# Patient Record
Sex: FEMALE | Race: WHITE | Employment: OTHER | URBAN - METROPOLITAN AREA
[De-identification: names, ages, dates, MRNs, and addresses within clinical notes are randomized per-mention and may not be internally consistent; named-entity substitution may affect disease eponyms.]

---

## 2018-07-30 ENCOUNTER — HOSPITAL ENCOUNTER (EMERGENCY)
Age: 83
Discharge: HOME | End: 2018-07-30
Attending: EMERGENCY MEDICINE
Payer: MEDICARE

## 2018-07-30 ENCOUNTER — APPOINTMENT (OUTPATIENT)
Dept: CT IMAGING | Age: 83
End: 2018-07-30
Payer: MEDICARE

## 2018-07-30 VITALS
SYSTOLIC BLOOD PRESSURE: 125 MMHG | RESPIRATION RATE: 16 BRPM | DIASTOLIC BLOOD PRESSURE: 54 MMHG | HEIGHT: 62 IN | OXYGEN SATURATION: 99 % | HEART RATE: 77 BPM | WEIGHT: 100 LBS | BODY MASS INDEX: 18.4 KG/M2 | TEMPERATURE: 98.1 F

## 2018-07-30 DIAGNOSIS — S09.90XA INJURY OF HEAD, INITIAL ENCOUNTER: Primary | ICD-10-CM

## 2018-07-30 PROCEDURE — 72125 CT NECK SPINE W/O DYE: CPT

## 2018-07-30 PROCEDURE — 70450 CT HEAD/BRAIN W/O DYE: CPT

## 2018-07-30 PROCEDURE — 6370000000 HC RX 637 (ALT 250 FOR IP): Performed by: EMERGENCY MEDICINE

## 2018-07-30 PROCEDURE — 99284 EMERGENCY DEPT VISIT MOD MDM: CPT

## 2018-07-30 RX ORDER — ACETAMINOPHEN 325 MG/1
650 TABLET ORAL ONCE
Status: COMPLETED | OUTPATIENT
Start: 2018-07-30 | End: 2018-07-30

## 2018-07-30 RX ORDER — FLUTICASONE PROPIONATE 50 MCG
1 SPRAY, SUSPENSION (ML) NASAL DAILY
COMMUNITY

## 2018-07-30 RX ORDER — ESCITALOPRAM OXALATE 10 MG/1
20 TABLET ORAL DAILY
Status: ON HOLD | COMMUNITY
End: 2020-10-13

## 2018-07-30 RX ORDER — PHENOL 1.4 %
1 AEROSOL, SPRAY (ML) MUCOUS MEMBRANE NIGHTLY
COMMUNITY

## 2018-07-30 RX ORDER — PRAVASTATIN SODIUM 20 MG
20 TABLET ORAL DAILY
Status: ON HOLD | COMMUNITY
End: 2020-10-13

## 2018-07-30 RX ADMIN — ACETAMINOPHEN 650 MG: 325 TABLET ORAL at 07:52

## 2018-07-30 ASSESSMENT — PAIN SCALES - GENERAL: PAINLEVEL_OUTOF10: 5

## 2018-07-30 NOTE — ED NOTES
Pt voided large amt on bedpain moves well in bed without discomfort     Vanessa Cueto, RN  07/30/18 0931

## 2018-10-04 ENCOUNTER — HOSPITAL ENCOUNTER (EMERGENCY)
Age: 83
Discharge: HOME | End: 2018-10-05
Attending: EMERGENCY MEDICINE
Payer: MEDICARE

## 2018-10-04 VITALS
HEART RATE: 71 BPM | HEIGHT: 62 IN | OXYGEN SATURATION: 99 % | TEMPERATURE: 98.3 F | WEIGHT: 100 LBS | SYSTOLIC BLOOD PRESSURE: 113 MMHG | DIASTOLIC BLOOD PRESSURE: 56 MMHG | RESPIRATION RATE: 16 BRPM | BODY MASS INDEX: 18.4 KG/M2

## 2018-10-04 DIAGNOSIS — G30.8 ALZHEIMER'S DISEASE OF OTHER ONSET WITH BEHAVIORAL DISTURBANCE: ICD-10-CM

## 2018-10-04 DIAGNOSIS — F02.818 ALZHEIMER'S DISEASE OF OTHER ONSET WITH BEHAVIORAL DISTURBANCE: ICD-10-CM

## 2018-10-04 DIAGNOSIS — R45.1 AGITATION: Primary | ICD-10-CM

## 2018-10-04 LAB
BACTERIA: NORMAL /HPF
BILIRUBIN URINE: NEGATIVE
BLOOD, URINE: ABNORMAL
CASTS: NORMAL /LPF
CLARITY: CLEAR
COLOR: YELLOW
GLUCOSE URINE: NEGATIVE MG/DL
KETONES, URINE: NEGATIVE MG/DL
LEUKOCYTE ESTERASE, URINE: NEGATIVE
NITRITE, URINE: NEGATIVE
PH UA: 5.5 (ref 5–9)
PROTEIN UA: NEGATIVE MG/DL
RBC UA: NORMAL /HPF (ref 0–2)
SPECIFIC GRAVITY UA: 1.02 (ref 1–1.03)
UROBILINOGEN, URINE: 0.2 E.U./DL
WBC UA: NORMAL /HPF (ref 0–5)

## 2018-10-04 PROCEDURE — 81001 URINALYSIS AUTO W/SCOPE: CPT

## 2018-10-04 PROCEDURE — 99285 EMERGENCY DEPT VISIT HI MDM: CPT

## 2018-10-04 PROCEDURE — 87088 URINE BACTERIA CULTURE: CPT

## 2018-10-05 NOTE — ED PROVIDER NOTES
Department of Emergency Medicine   ED  Provider Note  Admit Date/RoomTime: 10/4/2018  9:51 PM  ED Room: VIDAL/VIDAL      History of Present Illness:  10/4/18, Time: 10:15 PM         Keeley Goode is a 80 y.o. female presenting to the ED for agitation, beginning a few days ago. The complaint has been persistent, moderate in severity, and worsened by nothing. Pt arrived to the ED from [facility]. Per EMS pt was in bed and nurse at [facility] attempted to get pt out of bed. Pt lowered herself to the ground. Nurse at [facility] wanted pt to be seen in ED stating that she had cataract surgery the other day and feels that she needs to be seen. Son was notified and is POA and stated that he did not want her to come to ED and feels that pt Alzheimer's is progressing. Pt denies any loc, chest pain, sob, palpitations, fever, chills, nausea, vomiting, diarrhea or abdominal pain. Review of Systems:   Pertinent positives and negatives are stated within HPI, all other systems reviewed and are negative.    --------------------------------------------- PAST HISTORY ------------------------------------------  Past Medical History:  has a past medical history of Dementia; Diaphragmatic hernia; Diverticulosis; Hyperlipidemia; Macular degeneration; and Thyroid disease. Past Surgical History:  has no past surgical history on file. Social History:  reports that she has never smoked. She has never used smokeless tobacco. She reports that she does not drink alcohol or use drugs. Family History: family history is not on file. The patients home medications have been reviewed. Allergies: Dolobid [diflunisal]; Fish-derived products; Horse-derived products; Iodine; Morphine; Nsaids; Other; Penicillins; Percocet [oxycodone-acetaminophen];  Shellfish-derived products; and Sulfa antibiotics    ----------------------------------------------PHYSICAL EXAM--------------------------------------  Constitutional/General: Alert and oriented x3, well appearing, non toxic in NAD  Head: Normocephalic and atraumatic  Mouth: Oropharynx clear, handling secretions, no trismus, no asymmetry of the posterior oropharynx or uvular edema  Neck: Supple, no JVD, full ROM, no crepitus and no meningeal signs  Respiratory: Lungs clear to auscultation bilaterally, no wheezes, rales, or rhonchi. Not in respiratory distress  Cardiovascular:  Regular rate. Regular rhythm. No murmurs, gallops, or rubs. 2+ distal pulses  Chest: No chest wall tenderness  GI:  Abdomen Soft, Non tender, Non distended. +BS. No rebound, guarding, or rigidity. No pulsatile masses. Musculoskeletal: Moves all extremities x 4. Warm and well perfused, no clubbing, cyanosis, or edema. Integument: Skin warm and dry. No rashes. Neurologic: GCS 15, no focal deficits, symmetric strength 5/5 in the upper and lower extremities bilaterally  Psychiatric: Normal Affect    -------------------------------------------------- RESULTS -------------------------------------------------  I have personally reviewed all laboratory and imaging results for this patient. Results are listed below.      LABS:  Results for orders placed or performed during the hospital encounter of 10/04/18   Urinalysis   Result Value Ref Range    Color, UA Yellow Straw/Yellow    Clarity, UA Clear Clear    Glucose, Ur Negative Negative mg/dL    Bilirubin Urine Negative Negative    Ketones, Urine Negative Negative mg/dL    Specific Gravity, UA 1.025 1.005 - 1.030    Blood, Urine MODERATE (A) Negative    pH, UA 5.5 5.0 - 9.0    Protein, UA Negative Negative mg/dL    Urobilinogen, Urine 0.2 <2.0 E.U./dL    Nitrite, Urine Negative Negative    Leukocyte Esterase, Urine Negative Negative   Microscopic Urinalysis   Result Value Ref Range    Casts RARE /LPF    WBC, UA 1-3 0 - 5 /HPF    RBC, UA 2-5 0 - 2 /HPF    Bacteria, UA NONE /HPF       RADIOLOGY:  Interpreted by Radiologist.  No orders to display       EKG:    ------------------------- NURSING

## 2018-10-07 LAB — URINE CULTURE, ROUTINE: NORMAL

## 2020-10-12 ENCOUNTER — APPOINTMENT (OUTPATIENT)
Dept: CT IMAGING | Age: 85
DRG: 689 | End: 2020-10-12
Payer: MEDICARE

## 2020-10-12 ENCOUNTER — HOSPITAL ENCOUNTER (INPATIENT)
Age: 85
LOS: 1 days | Discharge: OTHER INSTITUTION | DRG: 689 | End: 2020-10-15
Attending: EMERGENCY MEDICINE | Admitting: HOSPITALIST
Payer: MEDICARE

## 2020-10-12 ENCOUNTER — APPOINTMENT (OUTPATIENT)
Dept: GENERAL RADIOLOGY | Age: 85
DRG: 689 | End: 2020-10-12
Payer: MEDICARE

## 2020-10-12 PROBLEM — R41.82 AMS (ALTERED MENTAL STATUS): Status: ACTIVE | Noted: 2020-10-12

## 2020-10-12 LAB
ALBUMIN SERPL-MCNC: 2.5 G/DL (ref 3.5–5.2)
ALP BLD-CCNC: 79 U/L (ref 35–104)
ALT SERPL-CCNC: 61 U/L (ref 0–32)
AMORPHOUS: ABNORMAL
ANION GAP SERPL CALCULATED.3IONS-SCNC: 6 MMOL/L (ref 7–16)
AST SERPL-CCNC: 114 U/L (ref 0–31)
BACTERIA: ABNORMAL /HPF
BASOPHILS ABSOLUTE: 0.01 E9/L (ref 0–0.2)
BASOPHILS RELATIVE PERCENT: 0.1 % (ref 0–2)
BILIRUB SERPL-MCNC: 0.3 MG/DL (ref 0–1.2)
BILIRUBIN URINE: NEGATIVE
BLOOD, URINE: ABNORMAL
BUN BLDV-MCNC: 22 MG/DL (ref 8–23)
BURR CELLS: ABNORMAL
CALCIUM SERPL-MCNC: 9 MG/DL (ref 8.6–10.2)
CHLORIDE BLD-SCNC: 106 MMOL/L (ref 98–107)
CLARITY: CLEAR
CO2: 22 MMOL/L (ref 22–29)
COLOR: YELLOW
CREAT SERPL-MCNC: 0.7 MG/DL (ref 0.5–1)
EOSINOPHILS ABSOLUTE: 0 E9/L (ref 0.05–0.5)
EOSINOPHILS RELATIVE PERCENT: 0 % (ref 0–6)
EPITHELIAL CELLS, UA: ABNORMAL /HPF
GFR AFRICAN AMERICAN: >60
GFR NON-AFRICAN AMERICAN: >60 ML/MIN/1.73
GLUCOSE BLD-MCNC: 126 MG/DL (ref 74–99)
GLUCOSE URINE: NEGATIVE MG/DL
HCT VFR BLD CALC: 29.6 % (ref 34–48)
HEMOGLOBIN: 9.8 G/DL (ref 11.5–15.5)
IMMATURE GRANULOCYTES #: 0.02 E9/L
IMMATURE GRANULOCYTES %: 0.2 % (ref 0–5)
KETONES, URINE: ABNORMAL MG/DL
LACTIC ACID, SEPSIS: 0.8 MMOL/L (ref 0.5–1.9)
LEUKOCYTE ESTERASE, URINE: ABNORMAL
LIPASE: 17 U/L (ref 13–60)
LYMPHOCYTES ABSOLUTE: 0.52 E9/L (ref 1.5–4)
LYMPHOCYTES RELATIVE PERCENT: 5.1 % (ref 20–42)
MCH RBC QN AUTO: 29.9 PG (ref 26–35)
MCHC RBC AUTO-ENTMCNC: 33.1 % (ref 32–34.5)
MCV RBC AUTO: 90.2 FL (ref 80–99.9)
MONOCYTES ABSOLUTE: 0.79 E9/L (ref 0.1–0.95)
MONOCYTES RELATIVE PERCENT: 7.8 % (ref 2–12)
NEUTROPHILS ABSOLUTE: 8.78 E9/L (ref 1.8–7.3)
NEUTROPHILS RELATIVE PERCENT: 86.8 % (ref 43–80)
NITRITE, URINE: NEGATIVE
PDW BLD-RTO: 14.3 FL (ref 11.5–15)
PH UA: 5.5 (ref 5–9)
PLATELET # BLD: 155 E9/L (ref 130–450)
PMV BLD AUTO: 10.8 FL (ref 7–12)
POIKILOCYTES: ABNORMAL
POTASSIUM REFLEX MAGNESIUM: 4.1 MMOL/L (ref 3.5–5)
PROTEIN UA: 30 MG/DL
RBC # BLD: 3.28 E12/L (ref 3.5–5.5)
RBC UA: ABNORMAL /HPF (ref 0–2)
SARS-COV-2, NAAT: NOT DETECTED
SCHISTOCYTES: ABNORMAL
SODIUM BLD-SCNC: 134 MMOL/L (ref 132–146)
SPECIFIC GRAVITY UA: 1.02 (ref 1–1.03)
TOTAL PROTEIN: 5.8 G/DL (ref 6.4–8.3)
TROPONIN: <0.01 NG/ML (ref 0–0.03)
UROBILINOGEN, URINE: 0.2 E.U./DL
WBC # BLD: 10.1 E9/L (ref 4.5–11.5)
WBC UA: ABNORMAL /HPF (ref 0–5)

## 2020-10-12 PROCEDURE — 81001 URINALYSIS AUTO W/SCOPE: CPT

## 2020-10-12 PROCEDURE — 80053 COMPREHEN METABOLIC PANEL: CPT

## 2020-10-12 PROCEDURE — U0002 COVID-19 LAB TEST NON-CDC: HCPCS

## 2020-10-12 PROCEDURE — 71045 X-RAY EXAM CHEST 1 VIEW: CPT

## 2020-10-12 PROCEDURE — 36415 COLL VENOUS BLD VENIPUNCTURE: CPT

## 2020-10-12 PROCEDURE — 83690 ASSAY OF LIPASE: CPT

## 2020-10-12 PROCEDURE — 6360000002 HC RX W HCPCS: Performed by: STUDENT IN AN ORGANIZED HEALTH CARE EDUCATION/TRAINING PROGRAM

## 2020-10-12 PROCEDURE — 96374 THER/PROPH/DIAG INJ IV PUSH: CPT

## 2020-10-12 PROCEDURE — 84484 ASSAY OF TROPONIN QUANT: CPT

## 2020-10-12 PROCEDURE — 2580000003 HC RX 258: Performed by: STUDENT IN AN ORGANIZED HEALTH CARE EDUCATION/TRAINING PROGRAM

## 2020-10-12 PROCEDURE — G0378 HOSPITAL OBSERVATION PER HR: HCPCS

## 2020-10-12 PROCEDURE — 96361 HYDRATE IV INFUSION ADD-ON: CPT

## 2020-10-12 PROCEDURE — 87186 SC STD MICRODIL/AGAR DIL: CPT

## 2020-10-12 PROCEDURE — 83605 ASSAY OF LACTIC ACID: CPT

## 2020-10-12 PROCEDURE — 99220 PR INITIAL OBSERVATION CARE/DAY 70 MINUTES: CPT | Performed by: HOSPITALIST

## 2020-10-12 PROCEDURE — 93005 ELECTROCARDIOGRAM TRACING: CPT | Performed by: STUDENT IN AN ORGANIZED HEALTH CARE EDUCATION/TRAINING PROGRAM

## 2020-10-12 PROCEDURE — 87077 CULTURE AEROBIC IDENTIFY: CPT

## 2020-10-12 PROCEDURE — 99283 EMERGENCY DEPT VISIT LOW MDM: CPT

## 2020-10-12 PROCEDURE — 85025 COMPLETE CBC W/AUTO DIFF WBC: CPT

## 2020-10-12 PROCEDURE — 99284 EMERGENCY DEPT VISIT MOD MDM: CPT

## 2020-10-12 PROCEDURE — 87088 URINE BACTERIA CULTURE: CPT

## 2020-10-12 PROCEDURE — 87040 BLOOD CULTURE FOR BACTERIA: CPT

## 2020-10-12 PROCEDURE — 74176 CT ABD & PELVIS W/O CONTRAST: CPT

## 2020-10-12 RX ORDER — 0.9 % SODIUM CHLORIDE 0.9 %
1000 INTRAVENOUS SOLUTION INTRAVENOUS ONCE
Status: COMPLETED | OUTPATIENT
Start: 2020-10-12 | End: 2020-10-12

## 2020-10-12 RX ADMIN — SODIUM CHLORIDE 1000 ML: 9 INJECTION, SOLUTION INTRAVENOUS at 19:45

## 2020-10-12 RX ADMIN — CEFTRIAXONE 1 G: 1 INJECTION, POWDER, FOR SOLUTION INTRAMUSCULAR; INTRAVENOUS at 21:40

## 2020-10-12 RX ADMIN — SODIUM CHLORIDE 1000 ML: 9 INJECTION, SOLUTION INTRAVENOUS at 17:34

## 2020-10-12 ASSESSMENT — PAIN SCALES - PAIN ASSESSMENT IN ADVANCED DEMENTIA (PAINAD)
BREATHING: 0
CONSOLABILITY: 0
NEGVOCALIZATION: 0
FACIALEXPRESSION: 0
TOTALSCORE: 0
BODYLANGUAGE: 0

## 2020-10-12 NOTE — ED NOTES
Bed: 17  Expected date:   Expected time:   Means of arrival:   Comments:  EMS     Catarino Knott RN  10/12/20 2422

## 2020-10-12 NOTE — ED PROVIDER NOTES
[facility]  Department of Emergency Medicine     Written by: Eliz Garcia DO  Patient Name: Magi Hernandez  Attending Provider: Radhika Tong DO  Admit Date: 10/12/2020  4:37 PM  MRN: [id number]                   : 3/25/1933        No chief complaint on file. - Chief complaint    The history is provided by the EMS personnel. Patient is an 80-year-old female who presents the ED via EMS from nursing facility due to diarrhea and stool incontinence, change in mental status from baseline dementia, fatigue and lethargy, and vomiting, symptoms began developing \"a few days\" prior to this encounter. According to EMS, the patient has been less oriented than usual, having stool incontinence (apparently nonblack, nonbloody), and she vomited once today before dinner prior to arrival.  History is limited due to patient's mental status. On arrival she is pleasant, cooperative, and able to tell me that she is not having any chest pain, shortness of breath, cough, neck pain or stiffness, abdominal tenderness, nausea, or urinary symptoms, however she is not oriented to purpose. She is oriented only to self. Review of Systems   Unable to perform ROS: Mental status change        Physical Exam  Vitals signs and nursing note reviewed. Constitutional:       General: She is not in acute distress. Appearance: Normal appearance. She is not ill-appearing. Comments: Pleasant, fatigued, cooperative, oriented only to self. HENT:      Head: Normocephalic and atraumatic. Right Ear: External ear normal.      Left Ear: External ear normal.      Nose: Nose normal. No rhinorrhea. Mouth/Throat:      Mouth: Mucous membranes are dry. Pharynx: Oropharynx is clear. Eyes:      Extraocular Movements: Extraocular movements intact. Conjunctiva/sclera: Conjunctivae normal.      Pupils: Pupils are equal, round, and reactive to light.    Neck:      Musculoskeletal: Normal range of motion and neck supple. No neck rigidity or muscular tenderness. Cardiovascular:      Rate and Rhythm: Normal rate and regular rhythm. Pulses: Normal pulses. Heart sounds: Normal heart sounds. No murmur. Pulmonary:      Effort: Pulmonary effort is normal. No respiratory distress. Breath sounds: Normal breath sounds. No wheezing or rales. Abdominal:      General: Abdomen is flat. There is no distension. Palpations: Abdomen is soft. There is no mass. Tenderness: There is no abdominal tenderness. There is no right CVA tenderness or left CVA tenderness. Comments: Bowel sounds hyperactive     Musculoskeletal: Normal range of motion. General: No swelling or tenderness. Right lower leg: No edema. Left lower leg: No edema. Skin:     General: Skin is warm and dry. Capillary Refill: Capillary refill takes less than 2 seconds. Neurological:      General: No focal deficit present. Mental Status: She is disoriented. Motor: Weakness (generalized) present. Comments: Oriented only to self  Cooperative            Procedures       MDM     63-year-old female presents to the ED from an outside facility due to change in mental status from baseline, multiple episodes of diarrhea, and one episode of vomiting today. History is limited due to patient's condition. According to EMS the diarrhea was nonblack nonbloody; patient is pleasant and cooperative however she is oriented to only self; she is unable to give me a reason as to why she is here. Son was at bedside initially and reports that she is worse from her baseline dementia. On arrival patient's blood pressure is 114/58, he is afebrile and nontachycardic with normal O2 saturation on room air; she does not appear to be in any acute distress. Specifically, her abdominal exam is benign. Rapid Covid is negative. Troponin is negative. Lipase is within normal limits.   Hemoglobin is 9.8, unsure of this patient's baseline; stool Hemoccult is negative. Initial lactate is 0.8. Urinalysis is positive for 5-10 white blood cells, leukocyte esterase, given patient's altered mental status decision made to administer 1 g IV Rocephin. Patient's SBP continues to remain in the low 100s, patient has received two boluses 1L IV NS.  WBC within normal limits, CMP unremarkable. CT abdomen pelvis noncontrast ordered, results revealed \"mildly thickened segments of small bowel in the left mid abdomen which may represent enteritis\", as well as large amounts of stool in the colon; soapsuds enema administered with good stool output. Decision made to admit this patient due to altered mental status and UTI and constipation with recent episodes of vomiting and diarrhea. Spoke with Dr. Beth [facility]h [facility]), discussed case; he will admit this patient for observation with telemetry. Patient is amenable to this plan, however considering her mental status change I alsoinformed her son of this plan, he is amenable. Patient has remained hemodynamically stable throughout this encounter. I have discussed this patient with my attending, who has seen the patient and agrees with this disposition. ED Course as of Oct 12 2224   Mon Oct 12, 2020   1758 Hemoglobin Quant(!): 9.8 [VG]   1758 Hematocrit(!): 29.6 [VG]   1758 IMPRESSION:  No acute pulmonary process. XR CHEST PORTABLE [VG]   1827 Lactic Acid, Sepsis: 0.8 [VG]   1846 SARS-CoV-2, NAAT: Not Detected [VG]   1846 Troponin: <0.01 [VG]   1920 Stool Hemoccult negative. Patient reassessed, still disoriented and seems very fatigued.     [VG]   1920 Noted, second bolus of IV normal saline ordered.    BP(!): 109/58 [VG]   1952 BP(!): 109/58 [VG]   1954 IMPRESSION:  Mildly thickened segments of small bowel in the left mid abdomen which may  represent enteritis.     Sigmoid diverticulosis with no evidence of diverticulitis.     Large amount of stool in the colon which may reflect constipation.     Scattered hepatic hypodensities likely cysts.     Septal lines in the lung bases bilaterally which may be seen with mild  interstitial pulmonary edema. Clinical correlation.      CT ABDOMEN PELVIS WO CONTRAST Additional Contrast? None [VG]   1955 With AMS   WBC, UA(!): 5-10 [VG]   2002 UTI + AMS - 1 g IV Rocephin ordered    [VG]   2002 CT abd/pelvis noted -- soap suds enema ordered    [VG]   2002 Lipase: 17 [VG]   2052 Per nursing, soapsuds enema has been successful and patient is having stool output. [VG]   2155 Spoke with Dr. Shelby Gaxiola, discussed patient, he agrees with our disposition and will admit this patient to observation with telemetry for AMS. [VG]   4826 Spoke with patient's son Portia Roque over the phone ([phone]) to update him on the plan for his mother, he voiced understanding and agrees with our plan for admission.    [VG]      ED Course User Index  [VG] Diallo Bush DO          Patient was seen and evaluated by myself and my attending Carter Irwin DO. Assessment and Plan discussed with attending provider, please see attestation for final plan of care.       --------------------------------------------- PAST HISTORY ---------------------------------------------  Past Medical History:  has a past medical history of Dementia, Diaphragmatic hernia, Diverticulosis, Hyperlipidemia, Macular degeneration, and Thyroid disease. Past Surgical History:  has no past surgical history on file. Social History:  reports that she has never smoked. She has never used smokeless tobacco. She reports that she does not drink alcohol or use drugs. Family History: family history is not on file. The patients home medications have been reviewed. Allergies: Dolobid [diflunisal]; Fish-derived products; Horse-derived products; Iodine; Morphine; Nsaids; Other; Penicillins; Percocet [oxycodone-acetaminophen];  Shellfish-derived products; and Sulfa antibiotics    -------------------------------------------------- RESULTS -------------------------------------------------    LABS:  Results for orders placed or performed during the hospital encounter of 10/12/20   CBC Auto Differential   Result Value Ref Range    WBC 10.1 4.5 - 11.5 E9/L    RBC 3.28 (L) 3.50 - 5.50 E12/L    Hemoglobin 9.8 (L) 11.5 - 15.5 g/dL    Hematocrit 29.6 (L) 34.0 - 48.0 %    MCV 90.2 80.0 - 99.9 fL    MCH 29.9 26.0 - 35.0 pg    MCHC 33.1 32.0 - 34.5 %    RDW 14.3 11.5 - 15.0 fL    Platelets 330 612 - 703 E9/L    MPV 10.8 7.0 - 12.0 fL    Neutrophils % 86.8 (H) 43.0 - 80.0 %    Immature Granulocytes % 0.2 0.0 - 5.0 %    Lymphocytes % 5.1 (L) 20.0 - 42.0 %    Monocytes % 7.8 2.0 - 12.0 %    Eosinophils % 0.0 0.0 - 6.0 %    Basophils % 0.1 0.0 - 2.0 %    Neutrophils Absolute 8.78 (H) 1.80 - 7.30 E9/L    Immature Granulocytes # 0.02 E9/L    Lymphocytes Absolute 0.52 (L) 1.50 - 4.00 E9/L    Monocytes Absolute 0.79 0.10 - 0.95 E9/L    Eosinophils Absolute 0.00 (L) 0.05 - 0.50 E9/L    Basophils Absolute 0.01 0.00 - 0.20 E9/L    Poikilocytes 1+     Schistocytes 1+     [place] Cells 1+    Comprehensive Metabolic Panel w/ Reflex to MG   Result Value Ref Range    Sodium 134 132 - 146 mmol/L    Potassium reflex Magnesium 4.1 3.5 - 5.0 mmol/L    Chloride 106 98 - 107 mmol/L    CO2 22 22 - 29 mmol/L    Anion Gap 6 (L) 7 - 16 mmol/L    Glucose 126 (H) 74 - 99 mg/dL    BUN 22 8 - 23 mg/dL    CREATININE 0.7 0.5 - 1.0 mg/dL    GFR Non-African American >60 >=60 mL/min/1.73    GFR African American >60     Calcium 9.0 8.6 - 10.2 mg/dL    Total Protein 5.8 (L) 6.4 - 8.3 g/dL    Alb 2.5 (L) 3.5 - 5.2 g/dL    Total Bilirubin 0.3 0.0 - 1.2 mg/dL    Alkaline Phosphatase 79 35 - 104 U/L    ALT 61 (H) 0 - 32 U/L     (H) 0 - 31 U/L   Lipase   Result Value Ref Range    Lipase 17 13 - 60 U/L   Troponin   Result Value Ref Range    Troponin <0.01 0.00 - 0.03 ng/mL   Urinalysis, reflex to microscopic   Result Value Ref Range    Color, UA Yellow Straw/Yellow    Clarity, UA Clear Clear    Glucose, Ur Negative Negative mg/dL    Bilirubin Urine Negative Negative    Ketones, Urine TRACE (A) Negative mg/dL    Specific Gravity, UA 1.025 1.005 - 1.030    Blood, Urine MODERATE (A) Negative    pH, UA 5.5 5.0 - 9.0    Protein, UA 30 (A) Negative mg/dL    Urobilinogen, Urine 0.2 <2.0 E.U./dL    Nitrite, Urine Negative Negative    Leukocyte Esterase, Urine SMALL (A) Negative   Lactate, Sepsis   Result Value Ref Range    Lactic Acid, Sepsis 0.8 0.5 - 1.9 mmol/L   COVID-19   Result Value Ref Range    SARS-CoV-2, NAAT Not Detected Not Detected   Microscopic Urinalysis   Result Value Ref Range    WBC, UA 5-10 (A) 0 - 5 /HPF    RBC, UA 0-1 0 - 2 /HPF    Epithelial Cells, UA FEW /HPF    Bacteria, UA FEW (A) None Seen /HPF    Amorphous, UA FEW        RADIOLOGY:  CT ABDOMEN PELVIS WO CONTRAST Additional Contrast? None   Final Result   Mildly thickened segments of small bowel in the left mid abdomen which may   represent enteritis. Sigmoid diverticulosis with no evidence of diverticulitis. Large amount of stool in the colon which may reflect constipation. Scattered hepatic hypodensities likely cysts. Septal lines in the lung bases bilaterally which may be seen with mild   interstitial pulmonary edema. Clinical correlation. XR CHEST PORTABLE   Final Result   No acute pulmonary process. EKG:  This EKG is signed and interpreted by me. Rate: 73  Rhythm: Sinus  Interpretation: no acute changes and non-specific EKG    ------------------------- NURSING NOTES AND VITALS REVIEWED ---------------------------  Date / Time Roomed:  10/12/2020  4:37 PM  ED Bed Assignment:  17/17    The nursing notes within the ED encounter and vital signs as below have been reviewed.      Patient Vitals for the past 24 hrs:   BP Temp Temp src Pulse Resp SpO2 Height Weight   10/12/20 2113 117/63 98.5 °F (36.9 °C) Oral 70 16 96 % -- -- 10/12/20 1923 (!) 109/58 -- -- 77 17 96 % -- --   10/12/20 1920 (!) 109/58 -- -- -- -- -- -- --   10/12/20 1714 (!) 114/58 98.5 °F (36.9 °C) Oral 80 18 97 % 5' 2\" (1.575 m) 100 lb (45.4 kg)       Oxygen Saturation Interpretation: Normal    ------------------------------------------ PROGRESS NOTES ------------------------------------------    Counseling:  I have spoken with the patient and her son and discussed todays results, in addition to providing specific details for the plan of care and counseling regarding the diagnosis and prognosis. Their questions are answered at this time and they are agreeable with the plan of admission.    --------------------------------- ADDITIONAL PROVIDER NOTES ---------------------------------    This patient has remained hemodynamically stable during their ED course. Diagnosis:  1. Altered mental status, unspecified altered mental status type    2. Nausea vomiting and diarrhea    3. Constipation, unspecified constipation type        Disposition:  Patient's disposition: Admit to telemetry  Patient's condition is stable.            Bindu Mathis DO  Resident  10/12/20 2403

## 2020-10-13 LAB
ALBUMIN SERPL-MCNC: 2.4 G/DL (ref 3.5–5.2)
ALP BLD-CCNC: 85 U/L (ref 35–104)
ALT SERPL-CCNC: 74 U/L (ref 0–32)
ANION GAP SERPL CALCULATED.3IONS-SCNC: 7 MMOL/L (ref 7–16)
AST SERPL-CCNC: 129 U/L (ref 0–31)
BASOPHILS ABSOLUTE: 0.01 E9/L (ref 0–0.2)
BASOPHILS RELATIVE PERCENT: 0.2 % (ref 0–2)
BILIRUB SERPL-MCNC: <0.2 MG/DL (ref 0–1.2)
BUN BLDV-MCNC: 18 MG/DL (ref 8–23)
CALCIUM SERPL-MCNC: 8.8 MG/DL (ref 8.6–10.2)
CHLORIDE BLD-SCNC: 109 MMOL/L (ref 98–107)
CO2: 25 MMOL/L (ref 22–29)
CREAT SERPL-MCNC: 0.8 MG/DL (ref 0.5–1)
EKG ATRIAL RATE: 73 BPM
EKG P AXIS: 62 DEGREES
EKG P-R INTERVAL: 168 MS
EKG Q-T INTERVAL: 424 MS
EKG QRS DURATION: 76 MS
EKG QTC CALCULATION (BAZETT): 467 MS
EKG R AXIS: 53 DEGREES
EKG T AXIS: 52 DEGREES
EKG VENTRICULAR RATE: 73 BPM
EOSINOPHILS ABSOLUTE: 0 E9/L (ref 0.05–0.5)
EOSINOPHILS RELATIVE PERCENT: 0 % (ref 0–6)
GFR AFRICAN AMERICAN: >60
GFR NON-AFRICAN AMERICAN: >60 ML/MIN/1.73
GLUCOSE BLD-MCNC: 131 MG/DL (ref 74–99)
HCT VFR BLD CALC: 30.7 % (ref 34–48)
HEMOGLOBIN: 9.9 G/DL (ref 11.5–15.5)
IMMATURE GRANULOCYTES #: 0.03 E9/L
IMMATURE GRANULOCYTES %: 0.5 % (ref 0–5)
LYMPHOCYTES ABSOLUTE: 0.65 E9/L (ref 1.5–4)
LYMPHOCYTES RELATIVE PERCENT: 9.8 % (ref 20–42)
MAGNESIUM: 2 MG/DL (ref 1.6–2.6)
MCH RBC QN AUTO: 29.6 PG (ref 26–35)
MCHC RBC AUTO-ENTMCNC: 32.2 % (ref 32–34.5)
MCV RBC AUTO: 91.6 FL (ref 80–99.9)
MONOCYTES ABSOLUTE: 0.62 E9/L (ref 0.1–0.95)
MONOCYTES RELATIVE PERCENT: 9.4 % (ref 2–12)
NEUTROPHILS ABSOLUTE: 5.29 E9/L (ref 1.8–7.3)
NEUTROPHILS RELATIVE PERCENT: 80.1 % (ref 43–80)
PDW BLD-RTO: 14.3 FL (ref 11.5–15)
PLATELET # BLD: 168 E9/L (ref 130–450)
PMV BLD AUTO: 10.7 FL (ref 7–12)
POTASSIUM REFLEX MAGNESIUM: 3.5 MMOL/L (ref 3.5–5)
RBC # BLD: 3.35 E12/L (ref 3.5–5.5)
SODIUM BLD-SCNC: 141 MMOL/L (ref 132–146)
TOTAL PROTEIN: 5.2 G/DL (ref 6.4–8.3)
WBC # BLD: 6.6 E9/L (ref 4.5–11.5)

## 2020-10-13 PROCEDURE — 99225 PR SBSQ OBSERVATION CARE/DAY 25 MINUTES: CPT | Performed by: INTERNAL MEDICINE

## 2020-10-13 PROCEDURE — 80053 COMPREHEN METABOLIC PANEL: CPT

## 2020-10-13 PROCEDURE — 6360000002 HC RX W HCPCS: Performed by: HOSPITALIST

## 2020-10-13 PROCEDURE — 96376 TX/PRO/DX INJ SAME DRUG ADON: CPT

## 2020-10-13 PROCEDURE — 83735 ASSAY OF MAGNESIUM: CPT

## 2020-10-13 PROCEDURE — 85025 COMPLETE CBC W/AUTO DIFF WBC: CPT

## 2020-10-13 PROCEDURE — G0378 HOSPITAL OBSERVATION PER HR: HCPCS

## 2020-10-13 PROCEDURE — 36415 COLL VENOUS BLD VENIPUNCTURE: CPT

## 2020-10-13 PROCEDURE — 6370000000 HC RX 637 (ALT 250 FOR IP): Performed by: INTERNAL MEDICINE

## 2020-10-13 PROCEDURE — 2580000003 HC RX 258: Performed by: HOSPITALIST

## 2020-10-13 PROCEDURE — 96372 THER/PROPH/DIAG INJ SC/IM: CPT

## 2020-10-13 PROCEDURE — 6370000000 HC RX 637 (ALT 250 FOR IP): Performed by: HOSPITALIST

## 2020-10-13 PROCEDURE — 97161 PT EVAL LOW COMPLEX 20 MIN: CPT

## 2020-10-13 RX ORDER — ACETAMINOPHEN 325 MG/1
650 TABLET ORAL EVERY 6 HOURS PRN
COMMUNITY

## 2020-10-13 RX ORDER — CALCIUM CARBONATE 500(1250)
600 TABLET ORAL NIGHTLY
Status: DISCONTINUED | OUTPATIENT
Start: 2020-10-13 | End: 2020-10-15 | Stop reason: HOSPADM

## 2020-10-13 RX ORDER — OMEPRAZOLE 20 MG/1
40 CAPSULE, DELAYED RELEASE ORAL DAILY
COMMUNITY

## 2020-10-13 RX ORDER — RISPERIDONE 0.5 MG/1
0.5 TABLET, ORALLY DISINTEGRATING ORAL 2 TIMES DAILY
Status: DISCONTINUED | OUTPATIENT
Start: 2020-10-13 | End: 2020-10-15 | Stop reason: HOSPADM

## 2020-10-13 RX ORDER — FERROUS SULFATE 325(65) MG
325 TABLET ORAL
Status: DISCONTINUED | OUTPATIENT
Start: 2020-10-14 | End: 2020-10-15 | Stop reason: HOSPADM

## 2020-10-13 RX ORDER — AZELASTINE 1 MG/ML
1 SPRAY, METERED NASAL 2 TIMES DAILY
COMMUNITY

## 2020-10-13 RX ORDER — LEVOTHYROXINE SODIUM 0.12 MG/1
125 TABLET ORAL DAILY
COMMUNITY

## 2020-10-13 RX ORDER — ESCITALOPRAM OXALATE 10 MG/1
20 TABLET ORAL DAILY
Status: DISCONTINUED | OUTPATIENT
Start: 2020-10-13 | End: 2020-10-13

## 2020-10-13 RX ORDER — ROSUVASTATIN CALCIUM 20 MG/1
20 TABLET, COATED ORAL DAILY
Status: ON HOLD | COMMUNITY
End: 2020-10-15 | Stop reason: HOSPADM

## 2020-10-13 RX ORDER — ROSUVASTATIN CALCIUM 40 MG/1
40 TABLET, COATED ORAL EVERY EVENING
COMMUNITY

## 2020-10-13 RX ORDER — LANOLIN ALCOHOL/MO/W.PET/CERES
10 CREAM (GRAM) TOPICAL NIGHTLY PRN
COMMUNITY

## 2020-10-13 RX ORDER — PROMETHAZINE HYDROCHLORIDE 25 MG/1
12.5 TABLET ORAL EVERY 6 HOURS PRN
Status: DISCONTINUED | OUTPATIENT
Start: 2020-10-13 | End: 2020-10-15 | Stop reason: HOSPADM

## 2020-10-13 RX ORDER — LEVOTHYROXINE SODIUM 0.1 MG/1
100 TABLET ORAL DAILY
Status: ON HOLD | COMMUNITY
End: 2020-10-15 | Stop reason: HOSPADM

## 2020-10-13 RX ORDER — LANOLIN ALCOHOL/MO/W.PET/CERES
10 CREAM (GRAM) TOPICAL NIGHTLY PRN
Status: DISCONTINUED | OUTPATIENT
Start: 2020-10-13 | End: 2020-10-15 | Stop reason: HOSPADM

## 2020-10-13 RX ORDER — CITALOPRAM 10 MG/1
10 TABLET ORAL DAILY
COMMUNITY

## 2020-10-13 RX ORDER — QUETIAPINE FUMARATE 50 MG/1
50 TABLET, FILM COATED ORAL DAILY
COMMUNITY

## 2020-10-13 RX ORDER — ACETAMINOPHEN 325 MG/1
650 TABLET ORAL EVERY 6 HOURS PRN
Status: DISCONTINUED | OUTPATIENT
Start: 2020-10-13 | End: 2020-10-15 | Stop reason: HOSPADM

## 2020-10-13 RX ORDER — SODIUM CHLORIDE 0.9 % (FLUSH) 0.9 %
10 SYRINGE (ML) INJECTION PRN
Status: DISCONTINUED | OUTPATIENT
Start: 2020-10-13 | End: 2020-10-15 | Stop reason: HOSPADM

## 2020-10-13 RX ORDER — PANTOPRAZOLE SODIUM 40 MG/1
40 TABLET, DELAYED RELEASE ORAL
Status: DISCONTINUED | OUTPATIENT
Start: 2020-10-14 | End: 2020-10-15 | Stop reason: HOSPADM

## 2020-10-13 RX ORDER — RISPERIDONE 0.5 MG/1
0.5 TABLET, ORALLY DISINTEGRATING ORAL 2 TIMES DAILY
COMMUNITY

## 2020-10-13 RX ORDER — POLYETHYLENE GLYCOL 3350 17 G/17G
17 POWDER, FOR SOLUTION ORAL DAILY PRN
Status: DISCONTINUED | OUTPATIENT
Start: 2020-10-13 | End: 2020-10-15 | Stop reason: HOSPADM

## 2020-10-13 RX ORDER — ACETAMINOPHEN 650 MG/1
650 SUPPOSITORY RECTAL EVERY 6 HOURS PRN
Status: DISCONTINUED | OUTPATIENT
Start: 2020-10-13 | End: 2020-10-15 | Stop reason: HOSPADM

## 2020-10-13 RX ORDER — QUETIAPINE FUMARATE 25 MG/1
50 TABLET, FILM COATED ORAL DAILY
Status: DISCONTINUED | OUTPATIENT
Start: 2020-10-13 | End: 2020-10-15 | Stop reason: HOSPADM

## 2020-10-13 RX ORDER — MEMANTINE HYDROCHLORIDE 21 MG/1
21 CAPSULE, EXTENDED RELEASE ORAL DAILY
COMMUNITY

## 2020-10-13 RX ORDER — FERROUS SULFATE 325(65) MG
325 TABLET ORAL
COMMUNITY

## 2020-10-13 RX ORDER — ROSUVASTATIN CALCIUM 20 MG/1
20 TABLET, COATED ORAL DAILY
Status: DISCONTINUED | OUTPATIENT
Start: 2020-10-13 | End: 2020-10-15 | Stop reason: HOSPADM

## 2020-10-13 RX ORDER — SODIUM CHLORIDE 0.9 % (FLUSH) 0.9 %
10 SYRINGE (ML) INJECTION EVERY 12 HOURS SCHEDULED
Status: DISCONTINUED | OUTPATIENT
Start: 2020-10-13 | End: 2020-10-15 | Stop reason: HOSPADM

## 2020-10-13 RX ORDER — DIVALPROEX SODIUM 125 MG/1
125 TABLET, DELAYED RELEASE ORAL 2 TIMES DAILY
COMMUNITY

## 2020-10-13 RX ORDER — PRAVASTATIN SODIUM 20 MG
20 TABLET ORAL DAILY
Status: DISCONTINUED | OUTPATIENT
Start: 2020-10-13 | End: 2020-10-13

## 2020-10-13 RX ORDER — DIVALPROEX SODIUM 125 MG/1
125 TABLET, DELAYED RELEASE ORAL 2 TIMES DAILY
Status: DISCONTINUED | OUTPATIENT
Start: 2020-10-13 | End: 2020-10-15 | Stop reason: HOSPADM

## 2020-10-13 RX ORDER — CITALOPRAM 20 MG/1
10 TABLET ORAL DAILY
Status: DISCONTINUED | OUTPATIENT
Start: 2020-10-13 | End: 2020-10-15 | Stop reason: HOSPADM

## 2020-10-13 RX ORDER — VITS A,C,E/LUTEIN/MINERALS 300MCG-200
1 TABLET ORAL DAILY
Status: DISCONTINUED | OUTPATIENT
Start: 2020-10-13 | End: 2020-10-15 | Stop reason: HOSPADM

## 2020-10-13 RX ORDER — LANOLIN ALCOHOL/MO/W.PET/CERES
1000 CREAM (GRAM) TOPICAL DAILY
Status: DISCONTINUED | OUTPATIENT
Start: 2020-10-13 | End: 2020-10-15 | Stop reason: HOSPADM

## 2020-10-13 RX ORDER — FLUTICASONE PROPIONATE 50 MCG
1 SPRAY, SUSPENSION (ML) NASAL DAILY
Status: DISCONTINUED | OUTPATIENT
Start: 2020-10-13 | End: 2020-10-15 | Stop reason: HOSPADM

## 2020-10-13 RX ORDER — LANOLIN ALCOHOL/MO/W.PET/CERES
1000 CREAM (GRAM) TOPICAL DAILY
COMMUNITY

## 2020-10-13 RX ORDER — EZETIMIBE 10 MG/1
5 TABLET ORAL DAILY
Status: DISCONTINUED | OUTPATIENT
Start: 2020-10-13 | End: 2020-10-15 | Stop reason: HOSPADM

## 2020-10-13 RX ORDER — ONDANSETRON 2 MG/ML
4 INJECTION INTRAMUSCULAR; INTRAVENOUS EVERY 6 HOURS PRN
Status: DISCONTINUED | OUTPATIENT
Start: 2020-10-13 | End: 2020-10-15 | Stop reason: HOSPADM

## 2020-10-13 RX ORDER — LEVOTHYROXINE SODIUM 0.12 MG/1
125 TABLET ORAL DAILY
Status: DISCONTINUED | OUTPATIENT
Start: 2020-10-13 | End: 2020-10-15 | Stop reason: HOSPADM

## 2020-10-13 RX ORDER — EZETIMIBE 10 MG/1
5 TABLET ORAL DAILY
COMMUNITY

## 2020-10-13 RX ORDER — DONEPEZIL HYDROCHLORIDE 10 MG/1
10 TABLET, FILM COATED ORAL NIGHTLY
COMMUNITY

## 2020-10-13 RX ORDER — DONEPEZIL HYDROCHLORIDE 5 MG/1
10 TABLET, FILM COATED ORAL NIGHTLY
Status: DISCONTINUED | OUTPATIENT
Start: 2020-10-13 | End: 2020-10-15 | Stop reason: HOSPADM

## 2020-10-13 RX ADMIN — FLUTICASONE PROPIONATE 1 SPRAY: 50 SPRAY, METERED NASAL at 07:42

## 2020-10-13 RX ADMIN — SODIUM CHLORIDE, PRESERVATIVE FREE 10 ML: 5 INJECTION INTRAVENOUS at 20:54

## 2020-10-13 RX ADMIN — CEFTRIAXONE 1 G: 1 INJECTION, POWDER, FOR SOLUTION INTRAMUSCULAR; INTRAVENOUS at 20:54

## 2020-10-13 RX ADMIN — CALCIUM 500 MG: 500 TABLET ORAL at 20:54

## 2020-10-13 RX ADMIN — CITALOPRAM HYDROBROMIDE 10 MG: 20 TABLET ORAL at 15:30

## 2020-10-13 RX ADMIN — PRAVASTATIN SODIUM 20 MG: 20 TABLET ORAL at 07:41

## 2020-10-13 RX ADMIN — QUETIAPINE FUMARATE 50 MG: 25 TABLET ORAL at 15:30

## 2020-10-13 RX ADMIN — Medication 1 TABLET: at 15:29

## 2020-10-13 RX ADMIN — DONEPEZIL HYDROCHLORIDE 10 MG: 5 TABLET, FILM COATED ORAL at 20:54

## 2020-10-13 RX ADMIN — LEVOTHYROXINE SODIUM 125 MCG: 125 TABLET ORAL at 15:30

## 2020-10-13 RX ADMIN — CYANOCOBALAMIN TAB 1000 MCG 1000 MCG: 1000 TAB at 15:29

## 2020-10-13 RX ADMIN — EZETIMIBE 5 MG: 10 TABLET ORAL at 15:29

## 2020-10-13 RX ADMIN — SODIUM CHLORIDE, PRESERVATIVE FREE 10 ML: 5 INJECTION INTRAVENOUS at 07:41

## 2020-10-13 RX ADMIN — DIVALPROEX SODIUM 125 MG: 125 TABLET, DELAYED RELEASE ORAL at 20:54

## 2020-10-13 RX ADMIN — ENOXAPARIN SODIUM 40 MG: 40 INJECTION SUBCUTANEOUS at 07:41

## 2020-10-13 RX ADMIN — RISPERIDONE 0.5 MG: 0.5 TABLET, ORALLY DISINTEGRATING ORAL at 20:54

## 2020-10-13 RX ADMIN — ESCITALOPRAM OXALATE 20 MG: 10 TABLET ORAL at 07:41

## 2020-10-13 ASSESSMENT — PAIN SCALES - PAIN ASSESSMENT IN ADVANCED DEMENTIA (PAINAD)
BODYLANGUAGE: 0
BREATHING: 0
FACIALEXPRESSION: 0
FACIALEXPRESSION: 0
NEGVOCALIZATION: 0
BODYLANGUAGE: 0
BREATHING: 0
CONSOLABILITY: 0
NEGVOCALIZATION: 0
TOTALSCORE: 0
BODYLANGUAGE: 0
FACIALEXPRESSION: 0
CONSOLABILITY: 0
BREATHING: 0
CONSOLABILITY: 0
TOTALSCORE: 0
NEGVOCALIZATION: 0
TOTALSCORE: 0

## 2020-10-13 ASSESSMENT — PAIN SCALES - GENERAL: PAINLEVEL_OUTOF10: 0

## 2020-10-13 NOTE — CARE COORDINATION
10/13/2020  Social Work Discharge Planning:Pt is from the [phone] and per ORJAMES, her baseline is walking with a ww. Awaiting therapy evals. Pt is currently on room air and on IV ATB. LESLIE/RUSS will continue to follow. Electronically signed by EVA Colon on 10/13/2020 at 10:23 AM

## 2020-10-13 NOTE — H&P
[facility] History and Physical      CHIEF COMPLAINT:   Presented from NH with AMS    History of Present Illness:       80-year-old female presented via EMS from nursing facility due to diarrhea and stool incontinence, change in mental status from baseline dementia, vomiting . Symptoms started about 3 days ago. According to EMS the diarrhea was nonblack nonbloody . Son was at bedside initially and reports that she is worse from her baseline dementia. Rapid Covid is negative. Troponin is negative. Lipase is within normal limits. stool Hemoccult is negative. UA was abnormal and patient received a dose of rocephin. She received NS bolus x2. CT abdomen pelvis noncontrast   revealed \"mildly thickened segments of small bowel in the left mid abdomen which may represent enteritis\", as well as large amounts of stool in the colon; soapsuds enema administered with good stool output. Informant(s) for H&P: son    REVIEW OF SYSTEMS:  Unable to assess due to mental status change     PMH:  Past Medical History:   Diagnosis Date    Dementia     Diaphragmatic hernia     Diverticulosis     Hyperlipidemia     Macular degeneration     Thyroid disease        Surgical History:  No past surgical history on file. Medications Prior to Admission:    Prior to Admission medications    Medication Sig Start Date End Date Taking?  Authorizing Provider   calcium carbonate 600 MG TABS tablet Take 1 tablet by mouth nightly    Historical Provider, MD   Docosahexaenoic Acid (DHA ALGAL-900 PO) Take by mouth daily    Historical Provider, MD   escitalopram (LEXAPRO) 10 MG tablet Take 20 mg by mouth daily    Historical Provider, MD   fluticasone (FLONASE) 50 MCG/ACT nasal spray 1 spray by Nasal route daily 1 spray each nostril daily    Historical Provider, MD   LUTEIN PO Take 1 capsule by mouth 2 times daily    Historical Provider, MD   pravastatin (PRAVACHOL) 20 MG tablet Take 20 mg by mouth daily Historical Provider, MD   NONFORMULARY Presservision/lutein 1 capsule 2 times daily    Historical Provider, MD       Allergies:    Dolobid [diflunisal]; Fish-derived products; Horse-derived products; Iodine; Morphine; Nsaids; Other; Penicillins; Percocet [oxycodone-acetaminophen]; Shellfish-derived products; and Sulfa antibiotics    Social History:    reports that she has never smoked. She has never used smokeless tobacco. She reports that she does not drink alcohol or use drugs. Family History:   family history is not on file. PHYSICAL EXAM:  Vitals:  /66   Pulse 77   Temp 98.5 °F (36.9 °C) (Oral)   Resp 16   Ht 5' 2\" (1.575 m)   Wt 100 lb (45.4 kg)   SpO2 96%   BMI 18.29 kg/m²      General Appearance: alert and oriented to person, place and time and in no acute distress  Skin: warm and dry  Head: normocephalic and atraumatic  Eyes: pupils equal, round, and reactive to light, extraocular eye movements intact, conjunctivae normal  Neck: neck supple and non tender without mass   Pulmonary/Chest: clear to auscultation bilaterally- no wheezes, rales or rhonchi, normal air movement, no respiratory distress  Cardiovascular: normal rate, normal S1 and S2 and no carotid bruits  Abdomen: soft, non-tender, non-distended, normal bowel sounds, no masses or organomegaly  Extremities: no cyanosis, no clubbing and no edema  Neurologic: aaox1      LABS:  Recent Labs     10/12/20  1725      K 4.1      CO2 22   BUN 22   CREATININE 0.7   GLUCOSE 126*   CALCIUM 9.0       Recent Labs     10/12/20  1725   WBC 10.1   RBC 3.28*   HGB 9.8*   HCT 29.6*   MCV 90.2   MCH 29.9   MCHC 33.1   RDW 14.3      MPV 10.8       No results for input(s): POCGLU in the last 72 hours. Radiology:   CT ABDOMEN PELVIS WO CONTRAST Additional Contrast? None   Final Result   Mildly thickened segments of small bowel in the left mid abdomen which may   represent enteritis.       Sigmoid diverticulosis with no evidence of diverticulitis. Large amount of stool in the colon which may reflect constipation. Scattered hepatic hypodensities likely cysts. Septal lines in the lung bases bilaterally which may be seen with mild   interstitial pulmonary edema. Clinical correlation. XR CHEST PORTABLE   Final Result   No acute pulmonary process. EKG:  NSR, rate 73    ASSESSMENT:      Active Problems:    AMS (altered mental status)  Resolved Problems:    * No resolved hospital problems. *   large stool in colon , s/p SSE in ED   Possible UTI   AMS , worse from baseline dementia     PLAN:     reconcile PTA meds   Empiric rocephin, follow urine and blood culture   Repeat labs in AM   Dispo: back to NH when able     DVT prophylaxis: lovenox       NOTE: This report was transcribed using voice recognition software. Every effort was made to ensure accuracy; however, inadvertent computerized transcription errors may be present.   Electronically signed by Bryan Funes MD on 10/12/2020 at 10:56 PM

## 2020-10-13 NOTE — PROGRESS NOTES
[facility] Hospitalist   Progress Note    Admitting Date and Time: 10/12/2020  4:37 PM  Admit Dx: AMS (altered mental status) [R41.82]  AMS (altered mental status) [R41.82]    Subjective: Admitted in the evening of 12th, sent from [facility] with change in mental status, baseline dementia, recent increase in diarrhea and stool incontinence. Rapid COVID negative. Possible UTI. Patient was admitted with AMS (altered mental status) [R41.82]  AMS (altered mental status) [R41.82]. Patient is awake, alert, does answer a few questions. Not in distress. Per RN: Patient did not have any urination, the bladder scanned which was more than 400 mL. Reconciliation needed to be done. ROS: denies fever, chills, cp, sob, n/v, HA unless stated above.      cefTRIAXone (ROCEPHIN) IV  1 g Intravenous Q24H    escitalopram  20 mg Oral Daily    calcium elemental  500 mg Oral Nightly    fluticasone  1 spray Nasal Daily    pravastatin  20 mg Oral Daily    sodium chloride flush  10 mL Intravenous 2 times per day    enoxaparin  40 mg Subcutaneous Daily    [START ON 10/15/2020] influenza virus vaccine  0.5 mL Intramuscular Prior to discharge     sodium chloride flush, 10 mL, PRN  acetaminophen, 650 mg, Q6H PRN    Or  acetaminophen, 650 mg, Q6H PRN  polyethylene glycol, 17 g, Daily PRN  promethazine, 12.5 mg, Q6H PRN    Or  ondansetron, 4 mg, Q6H PRN         Objective:    BP (!) 115/48   Pulse 68   Temp 98.7 °F (37.1 °C) (Oral)   Resp 14   Ht 5' 2\" (1.575 m)   Wt 122 lb 9.2 oz (55.6 kg)   SpO2 98%   BMI 22.42 kg/m²   General Appearance: alert and oriented to person, place and time, well-developed and well-nourished, in no acute distress  Skin: warm and dry, no rash or erythema, decreased in skin turgor  Head: normocephalic and atraumatic  Eyes: pupils equal, round, and reactive to light, extraocular eye movements intact, conjunctivae normal  ENT: tympanic membrane, external ear and ear canal normal bilaterally, oropharynx clear and moist with normal mucous membranes  Neck: neck supple and non tender without mass, no thyromegaly or thyroid nodules, no cervical lymphadenopathy   Pulmonary/Chest: clear to auscultation bilaterally- no wheezes, rales or rhonchi, normal air movement, no respiratory distress  Cardiovascular: normal rate, normal S1 and S2, no gallops, intact distal pulses and no carotid bruits  Abdomen: soft, non-tender, non-distended, normal bowel sounds, no masses or organomegaly      Recent Labs     10/12/20  1725      K 4.1      CO2 22   BUN 22   CREATININE 0.7   GLUCOSE 126*   CALCIUM 9.0       Recent Labs     10/12/20  1725   WBC 10.1   RBC 3.28*   HGB 9.8*   HCT 29.6*   MCV 90.2   MCH 29.9   MCHC 33.1   RDW 14.3      MPV 10.8       Radiology:   CT ABDOMEN PELVIS WO CONTRAST Additional Contrast? None   Final Result   Mildly thickened segments of small bowel in the left mid abdomen which may   represent enteritis. Sigmoid diverticulosis with no evidence of diverticulitis. Large amount of stool in the colon which may reflect constipation. Scattered hepatic hypodensities likely cysts. Septal lines in the lung bases bilaterally which may be seen with mild   interstitial pulmonary edema. Clinical correlation. XR CHEST PORTABLE   Final Result   No acute pulmonary process. Assessment:    Active Problems:    AMS (altered mental status)  Resolved Problems:    * No resolved hospital problems. *      Plan:  1. Change in mental status, slowly improving. 2.         More due to UTI, initial culture growing gram-negative organism, more than 100,000 per high-power field, patient remains on empiric ceftriaxone. No change. 3.         One-time straight cath was ordered, for the urinary retention. 4.          We will also continue IV fluids at least a liter for now. 5.          Med rec completed.         Electronically signed by Darlyn Roger Elan Pereira MD on 10/13/2020 at 8:50 AM

## 2020-10-13 NOTE — PROGRESS NOTES
House physicians will go over admission med rec on day shift.  Medications updated per facility list.

## 2020-10-13 NOTE — PROGRESS NOTES
Alfredo Ha:  This patient's current weight is 45.4 kg and is currently ordered Lovenox 40 mg daily. Since this dose is approximately 1 mg/kg for this patient, Pharmacy recommends decreasing the dose to 30 mg daily.   Tanya Pemberton [facility]  10/13/2020  12:59 AM

## 2020-10-14 LAB
ORGANISM: ABNORMAL
URINE CULTURE, ROUTINE: ABNORMAL

## 2020-10-14 PROCEDURE — 6370000000 HC RX 637 (ALT 250 FOR IP): Performed by: INTERNAL MEDICINE

## 2020-10-14 PROCEDURE — 6360000002 HC RX W HCPCS: Performed by: HOSPITALIST

## 2020-10-14 PROCEDURE — 99225 PR SBSQ OBSERVATION CARE/DAY 25 MINUTES: CPT | Performed by: INTERNAL MEDICINE

## 2020-10-14 PROCEDURE — G0378 HOSPITAL OBSERVATION PER HR: HCPCS

## 2020-10-14 PROCEDURE — 97165 OT EVAL LOW COMPLEX 30 MIN: CPT

## 2020-10-14 PROCEDURE — 96376 TX/PRO/DX INJ SAME DRUG ADON: CPT

## 2020-10-14 PROCEDURE — 97535 SELF CARE MNGMENT TRAINING: CPT

## 2020-10-14 PROCEDURE — 2580000003 HC RX 258: Performed by: HOSPITALIST

## 2020-10-14 PROCEDURE — 96372 THER/PROPH/DIAG INJ SC/IM: CPT

## 2020-10-14 PROCEDURE — 6370000000 HC RX 637 (ALT 250 FOR IP): Performed by: HOSPITALIST

## 2020-10-14 PROCEDURE — 2580000003 HC RX 258: Performed by: INTERNAL MEDICINE

## 2020-10-14 RX ORDER — SODIUM CHLORIDE, SODIUM LACTATE, POTASSIUM CHLORIDE, CALCIUM CHLORIDE 600; 310; 30; 20 MG/100ML; MG/100ML; MG/100ML; MG/100ML
INJECTION, SOLUTION INTRAVENOUS CONTINUOUS
Status: DISCONTINUED | OUTPATIENT
Start: 2020-10-14 | End: 2020-10-15 | Stop reason: HOSPADM

## 2020-10-14 RX ADMIN — CALCIUM 500 MG: 500 TABLET ORAL at 20:21

## 2020-10-14 RX ADMIN — SODIUM CHLORIDE, POTASSIUM CHLORIDE, SODIUM LACTATE AND CALCIUM CHLORIDE: 600; 310; 30; 20 INJECTION, SOLUTION INTRAVENOUS at 15:20

## 2020-10-14 RX ADMIN — RISPERIDONE 0.5 MG: 0.5 TABLET, ORALLY DISINTEGRATING ORAL at 20:21

## 2020-10-14 RX ADMIN — DONEPEZIL HYDROCHLORIDE 10 MG: 5 TABLET, FILM COATED ORAL at 20:21

## 2020-10-14 RX ADMIN — SODIUM CHLORIDE, PRESERVATIVE FREE 10 ML: 5 INJECTION INTRAVENOUS at 20:38

## 2020-10-14 RX ADMIN — CITALOPRAM HYDROBROMIDE 10 MG: 20 TABLET ORAL at 08:46

## 2020-10-14 RX ADMIN — Medication 1 TABLET: at 08:46

## 2020-10-14 RX ADMIN — DIVALPROEX SODIUM 125 MG: 125 TABLET, DELAYED RELEASE ORAL at 08:46

## 2020-10-14 RX ADMIN — FLUTICASONE PROPIONATE 1 SPRAY: 50 SPRAY, METERED NASAL at 08:46

## 2020-10-14 RX ADMIN — EZETIMIBE 5 MG: 10 TABLET ORAL at 08:46

## 2020-10-14 RX ADMIN — ENOXAPARIN SODIUM 40 MG: 40 INJECTION SUBCUTANEOUS at 08:45

## 2020-10-14 RX ADMIN — LEVOTHYROXINE SODIUM 125 MCG: 125 TABLET ORAL at 06:17

## 2020-10-14 RX ADMIN — FERROUS SULFATE TAB 325 MG (65 MG ELEMENTAL FE) 325 MG: 325 (65 FE) TAB at 08:47

## 2020-10-14 RX ADMIN — CYANOCOBALAMIN TAB 1000 MCG 1000 MCG: 1000 TAB at 08:46

## 2020-10-14 RX ADMIN — SODIUM CHLORIDE, PRESERVATIVE FREE 10 ML: 5 INJECTION INTRAVENOUS at 08:47

## 2020-10-14 RX ADMIN — ROSUVASTATIN CALCIUM 20 MG: 20 TABLET, FILM COATED ORAL at 08:46

## 2020-10-14 RX ADMIN — CEFTRIAXONE 1 G: 1 INJECTION, POWDER, FOR SOLUTION INTRAMUSCULAR; INTRAVENOUS at 20:21

## 2020-10-14 RX ADMIN — QUETIAPINE FUMARATE 50 MG: 25 TABLET ORAL at 08:47

## 2020-10-14 RX ADMIN — PANTOPRAZOLE SODIUM 40 MG: 40 TABLET, DELAYED RELEASE ORAL at 06:17

## 2020-10-14 RX ADMIN — RISPERIDONE 0.5 MG: 0.5 TABLET, ORALLY DISINTEGRATING ORAL at 08:47

## 2020-10-14 RX ADMIN — Medication 10.5 MG: at 20:21

## 2020-10-14 RX ADMIN — DIVALPROEX SODIUM 125 MG: 125 TABLET, DELAYED RELEASE ORAL at 20:37

## 2020-10-14 ASSESSMENT — PAIN SCALES - PAIN ASSESSMENT IN ADVANCED DEMENTIA (PAINAD)
FACIALEXPRESSION: 0
NEGVOCALIZATION: 0
BODYLANGUAGE: 0
TOTALSCORE: 0
CONSOLABILITY: 0
BREATHING: 0

## 2020-10-14 ASSESSMENT — PAIN SCALES - GENERAL
PAINLEVEL_OUTOF10: 0
PAINLEVEL_OUTOF10: 1

## 2020-10-14 NOTE — PROGRESS NOTES
Spoke to Alma at [facility] [place], she feels they can accommodate patient other than IV antibiotics.   Dell Garcia

## 2020-10-14 NOTE — PROGRESS NOTES
Occupational Therapy  OCCUPATIONAL THERAPY INITIAL EVALUATION      Date:10/14/2020  Patient Name: Lucero Davison  MRN: [id number]  : 3/25/1933  Room: [address] [place], [place] [address]    Referring Provider: Magaly Hernandez MD    Evaluating OT: Mickle Severance OTR/L [id number]    AM-PAC Daily Activity Raw Score:     Recommended Adaptive Equipment: TBD    Diagnosis: AMS. Pt presents to ED from [facility] with diarrhea, AMS, fatigue and lethargy     Pertinent Medical History: dementia, macular degeneration   Precautions:  falls     Home Living: Pt is a poor historian unable to provide PLOF. Per medical chart pt is a LTC resident of [facility]. Use of [facility] for mobility. Unknown level of assist with ADLs. Pain Level: no reported or observable signs of pain    Cognition: A&O: . Pt is oriented to self only. Lethargic initially cues to open eyes and interact in environment. Inconsistent answers to questions, nonsensical conversation.     Problem solving:  poor   Judgement/safety:  poor     Functional Assessment:   Initial Eval Status  Date: 10/14/20 Treatment session:  Short Term Goals  Treatment frequency: 2-5x/wk PRN x1-3 wks     Feeding Max A  Hand over hand for lunch tray with use of fork and cup with straw  Mod A   Grooming Max A  Hand over hand washing face pre and post lunch  Mod A   UB Dressing Dependent  Management of hospital gown  Max A   LB Dressing Dependent  Management of B socks  Max A    Bathing Max A  Mod A   Toileting Dependent  Mod A   Bed Mobility  Supine to sit: Dependent  Sit to Supine: Max A     Functional Transfers STS: Max A  Mod A   Functional Mobility Max A with HHA  Small side steps to HOB  Mod A during ADLs   Balance Sitting: varying levels from poor plus to fair minus  Sitting saurav EOB x10 min prior to increased fatigue and decreased attempt to keep feet on floor    Standing: poor HHA  Standing saurav x30 seconds x2 trials     Activity Tolerance poor  standing saurav x2-3 min with fair minus balance during self care tasks           ADL: OT instructs pt in active participation, UE ROM, functional use of UEs, core stability, activity tolerance and retraining ADLs with mod to max cues throughout session. Pt requires increased time to become alert and engage within environment however then is able to assist and attempts to initiate self care tasks. Treatment: Patient educated on techniques for completion of ADL, safe functional transfers and functional mobility. Patient required cues for follow through with proper hand/foot placement, pacing, safety, attention, sequencing and technique in bed mobility, functional transfers, functional mobility, self feeding, grooming, UB dressing and LB dressing in preparation for maximum independence in all self care tasks. Hand Dominance: Right []  Left []   Strength ROM Additional Info:    BUE  Unable to follow formal MMT testing  Requires hand over hand for full functional use of UEs.  Does initiate grasp/release and reaching on her own A/AROM appears WFL poor  and FMC/dexterity noted during ADL tasks         Hearing: appears functional  Vision: cues to open eyes, appears functional in immediate environment  Sensation:  No c/o numbness or tingling   Tone: WFL                            Long Term Goal (1-3 wks): Pt will maximize functional performance in all self care tasks/functional transfers with good follow through of all trained techniques for safe transition to next level of care    Eval Complexity: Low    Assessment of current deficits   Functional mobility [x]  ADLs [x] Strength [x]  Cognition []  Functional transfers  [x] IADLs [x] Safety Awareness [x]  Endurance [x]  Fine Motor Coordination [] Balance [x] Vision/perception [] Sensation []   Gross Motor Coordination [] ROM [] Delirium []                  Motor Control []    Plan of Care:   [x] ADL retraining/AE recommendations specific to weakness, fatigue, decreased cognitive status  [x] Energy Conservation

## 2020-10-14 NOTE — CARE COORDINATION
10/14/2020  Social Work Discharge Planning:AM PAC is 10/24. LESLIE spoke to Pts alize Everett regarding [facility] and choices. Silva Everett will be up today to visit Pt and select some SARs for referrals. Pt is from [facility]. [phone] and per Lois Gonzalez, her baseline is walking with a ww. Electronically signed by EVA Caceres on 10/14/2020 at 9:02 AM    10/14/2020  Social Work Discharge Planning:SW received a call from alize Everett who informed that he spoke with [facility][phone] and they said they would like a nurse to nurse call before resorting to [facility], to see if they can accommodate Pt there. LESLIE notified nurse. Electronically signed by EVA Caceres on 10/14/2020 at 9:54 AM

## 2020-10-14 NOTE — PROGRESS NOTES
[facility] Hospitalist   Progress Note    Admitting Date and Time: 10/12/2020  4:37 PM  Admit Dx: AMS (altered mental status) [R41.82]  AMS (altered mental status) [R41.82]    Subjective: Admitted in the evening of 12th, sent from [facility] with change in mental status, baseline dementia, recent increase in diarrhea and stool incontinence. Rapid COVID negative. Possible UTI. E. coli from urine culture resistant to ampicillin, Levaquin, Bactrim. Patient was admitted with AMS (altered mental status) [R41.82]  AMS (altered mental status) [R41.82]. Patient resting, does respond, limited communication, more as patient sleepy. Per RN: Patient was awake, talkative with the nurse earlier, also per nursing patient does make urine. ROS: denies fever, chills, cp, sob, n/v, HA unless stated above.      cefTRIAXone (ROCEPHIN) IV  1 g Intravenous Q24H    calcium elemental  500 mg Oral Nightly    fluticasone  1 spray Nasal Daily    sodium chloride flush  10 mL Intravenous 2 times per day    enoxaparin  40 mg Subcutaneous Daily    [START ON 10/15/2020] influenza virus vaccine  0.5 mL Intramuscular Prior to discharge    citalopram  10 mg Oral Daily    divalproex  125 mg Oral BID    donepezil  10 mg Oral Nightly    ezetimibe  5 mg Oral Daily    levothyroxine  125 mcg Oral Daily    ferrous sulfate  325 mg Oral Daily with breakfast    ocuvite-lutein  1 tablet Oral Daily    pantoprazole  40 mg Oral QAM AC    QUEtiapine  50 mg Oral Daily    risperiDONE  0.5 mg Oral BID    rosuvastatin  20 mg Oral Daily    vitamin B-12  1,000 mcg Oral Daily     sodium chloride flush, 10 mL, PRN  acetaminophen, 650 mg, Q6H PRN    Or  acetaminophen, 650 mg, Q6H PRN  polyethylene glycol, 17 g, Daily PRN  promethazine, 12.5 mg, Q6H PRN    Or  ondansetron, 4 mg, Q6H PRN  melatonin, 10.5 mg, Nightly PRN         Objective:    BP (!) 140/69   Pulse 68   Temp 97.9 °F (36.6 °C) (Axillary)   Resp 16   Ht 5' 2\" (1.575 m) Wt 114 lb (51.7 kg)   SpO2 96%   BMI 20.85 kg/m²   General Appearance: alert and oriented to person, place and time, well-developed and well-nourished, in no acute distress  Skin: warm and dry, no rash or erythema, decreased in skin turgor  Head: normocephalic and atraumatic  Eyes: pupils equal, round, and reactive to light, extraocular eye movements intact, conjunctivae normal  ENT: tympanic membrane, external ear and ear canal normal bilaterally, oropharynx clear and moist with normal mucous membranes  Neck: neck supple and non tender without mass, no thyromegaly or thyroid nodules, no cervical lymphadenopathy   Pulmonary/Chest: clear to auscultation bilaterally- no wheezes, rales or rhonchi, normal air movement, no respiratory distress  Cardiovascular: normal rate, normal S1 and S2, no gallops, intact distal pulses and no carotid bruits  Abdomen: soft, non-tender, non-distended, normal bowel sounds, no masses or organomegaly      Recent Labs     10/12/20  1725 10/13/20  1028    141   K 4.1 3.5    109*   CO2 22 25   BUN 22 18   CREATININE 0.7 0.8   GLUCOSE 126* 131*   CALCIUM 9.0 8.8       Recent Labs     10/12/20  1725 10/13/20  1028   WBC 10.1 6.6   RBC 3.28* 3.35*   HGB 9.8* 9.9*   HCT 29.6* 30.7*   MCV 90.2 91.6   MCH 29.9 29.6   MCHC 33.1 32.2   RDW 14.3 14.3    168   MPV 10.8 10.7     Blood culture, pending    Radiology:   CT ABDOMEN PELVIS WO CONTRAST Additional Contrast? None   Final Result   Mildly thickened segments of small bowel in the left mid abdomen which may   represent enteritis. Sigmoid diverticulosis with no evidence of diverticulitis. Large amount of stool in the colon which may reflect constipation. Scattered hepatic hypodensities likely cysts. Septal lines in the lung bases bilaterally which may be seen with mild   interstitial pulmonary edema. Clinical correlation. XR CHEST PORTABLE   Final Result   No acute pulmonary process. Assessment:    Active Problems:    AMS (altered mental status)  Resolved Problems:    * No resolved hospital problems. *      Plan:  1. Change in mental status, slowly improving. 2.         More due to E. coli UTI, resistant to multiple antibiotics, patient might need IV antibiotics on DC, will ask ID to see the patient. 3.         Per nursing patient does make urine. 4.          We will also continue IV fluids at least a liter for now. 5.          DC planning likely in a day or so if IV antibiotics can be continued. Will also depend upon blood culture when it comes back. Also on input from ID.         Electronically signed by Vinh Contreras MD on 10/14/2020 at 12:30 PM

## 2020-10-15 VITALS
WEIGHT: 114 LBS | HEIGHT: 62 IN | BODY MASS INDEX: 20.98 KG/M2 | HEART RATE: 82 BPM | OXYGEN SATURATION: 95 % | SYSTOLIC BLOOD PRESSURE: 120 MMHG | TEMPERATURE: 98.4 F | RESPIRATION RATE: 14 BRPM | DIASTOLIC BLOOD PRESSURE: 55 MMHG

## 2020-10-15 PROBLEM — R41.82 ALTERED MENTAL STATUS: Status: ACTIVE | Noted: 2020-10-15

## 2020-10-15 PROCEDURE — 6360000002 HC RX W HCPCS: Performed by: HOSPITALIST

## 2020-10-15 PROCEDURE — 97110 THERAPEUTIC EXERCISES: CPT

## 2020-10-15 PROCEDURE — 97530 THERAPEUTIC ACTIVITIES: CPT

## 2020-10-15 PROCEDURE — 97535 SELF CARE MNGMENT TRAINING: CPT

## 2020-10-15 PROCEDURE — 2580000003 HC RX 258: Performed by: INTERNAL MEDICINE

## 2020-10-15 PROCEDURE — 99239 HOSP IP/OBS DSCHRG MGMT >30: CPT | Performed by: INTERNAL MEDICINE

## 2020-10-15 PROCEDURE — 6370000000 HC RX 637 (ALT 250 FOR IP): Performed by: INTERNAL MEDICINE

## 2020-10-15 PROCEDURE — 2580000003 HC RX 258: Performed by: HOSPITALIST

## 2020-10-15 PROCEDURE — 2060000000 HC ICU INTERMEDIATE R&B

## 2020-10-15 RX ORDER — CEFDINIR 300 MG/1
300 CAPSULE ORAL 2 TIMES DAILY
Qty: 20 CAPSULE | Refills: 0 | Status: SHIPPED | OUTPATIENT
Start: 2020-10-15 | End: 2020-10-25

## 2020-10-15 RX ADMIN — DIVALPROEX SODIUM 125 MG: 125 TABLET, DELAYED RELEASE ORAL at 09:45

## 2020-10-15 RX ADMIN — CYANOCOBALAMIN TAB 1000 MCG 1000 MCG: 1000 TAB at 09:43

## 2020-10-15 RX ADMIN — ROSUVASTATIN CALCIUM 20 MG: 20 TABLET, FILM COATED ORAL at 09:44

## 2020-10-15 RX ADMIN — RISPERIDONE 0.5 MG: 0.5 TABLET, ORALLY DISINTEGRATING ORAL at 09:44

## 2020-10-15 RX ADMIN — FERROUS SULFATE TAB 325 MG (65 MG ELEMENTAL FE) 325 MG: 325 (65 FE) TAB at 09:43

## 2020-10-15 RX ADMIN — PANTOPRAZOLE SODIUM 40 MG: 40 TABLET, DELAYED RELEASE ORAL at 06:16

## 2020-10-15 RX ADMIN — ENOXAPARIN SODIUM 40 MG: 40 INJECTION SUBCUTANEOUS at 09:48

## 2020-10-15 RX ADMIN — CITALOPRAM HYDROBROMIDE 10 MG: 20 TABLET ORAL at 09:46

## 2020-10-15 RX ADMIN — Medication 1 TABLET: at 09:42

## 2020-10-15 RX ADMIN — SODIUM CHLORIDE, PRESERVATIVE FREE 10 ML: 5 INJECTION INTRAVENOUS at 09:48

## 2020-10-15 RX ADMIN — FLUTICASONE PROPIONATE 1 SPRAY: 50 SPRAY, METERED NASAL at 09:56

## 2020-10-15 RX ADMIN — EZETIMIBE 5 MG: 10 TABLET ORAL at 09:47

## 2020-10-15 RX ADMIN — QUETIAPINE FUMARATE 50 MG: 25 TABLET ORAL at 09:44

## 2020-10-15 RX ADMIN — SODIUM CHLORIDE, POTASSIUM CHLORIDE, SODIUM LACTATE AND CALCIUM CHLORIDE: 600; 310; 30; 20 INJECTION, SOLUTION INTRAVENOUS at 02:09

## 2020-10-15 RX ADMIN — LEVOTHYROXINE SODIUM 125 MCG: 125 TABLET ORAL at 06:16

## 2020-10-15 ASSESSMENT — PAIN SCALES - GENERAL
PAINLEVEL_OUTOF10: 0

## 2020-10-15 ASSESSMENT — PAIN SCALES - PAIN ASSESSMENT IN ADVANCED DEMENTIA (PAINAD)
TOTALSCORE: 0
CONSOLABILITY: 0
NEGVOCALIZATION: 0
TOTALSCORE: 0
BREATHING: 0
FACIALEXPRESSION: 0
FACIALEXPRESSION: 0
CONSOLABILITY: 0
BODYLANGUAGE: 0
BREATHING: 0
NEGVOCALIZATION: 0
BODYLANGUAGE: 0

## 2020-10-15 NOTE — CONSULTS
[facility]     Consult Note                                 [address] [facility], [address]                   Phone [phone]     Fax [phone]        Date:   10/15/2020  Patient name:  Barb Stone  Date of admission:  10/12/2020  4:37 PM  MRN:   [id number]  YOB: 1933    Reason for Consult: Altered mental status    CC:   Chief Complaint   Patient presents with    Diarrhea     patient has loose stools and is weak    Emesis     EMS was given history of emesis       HISTORY OF PRESENT ILLNESS:                The patient is a 80 y.o. female admitted with altered mental status, most of the history obtained is from medical records. Patient does not know why she is in the hospital.  She was found to have UTI currently on ceftriaxone and urine cultures are growing E. Coli. CT abdomen pelvis with IV contrast shows mildly thickened segments of small bowel in the left mid abdomen which may represent enteritis. Past Medical History:   has a past medical history of Dementia, Diaphragmatic hernia, Diverticulosis, Hyperlipidemia, Macular degeneration, and Thyroid disease. Past Surgical History:   has no past surgical history on file. Home Medications:    Prior to Admission medications    Medication Sig Start Date End Date Taking?  Authorizing Provider   azelastine (ASTELIN) 0.1 % nasal spray 1 spray by Nasal route 2 times daily Use in each nostril as directed    Historical Provider, MD   citalopram (CELEXA) 10 MG tablet Take 10 mg by mouth daily    Historical Provider, MD   divalproex (DEPAKOTE) 125 MG DR tablet Take 125 mg by mouth 2 times daily    Historical Provider, MD   donepezil (ARICEPT) 10 MG tablet Take 10 mg by mouth nightly    Historical Provider, MD   ferrous sulfate (IRON 325) 325 (65 Fe) MG tablet Take 325 mg by mouth daily (with breakfast)    Historical Provider, MD levothyroxine (SYNTHROID) 100 MCG tablet Take 100 mcg by mouth Daily    Historical Provider, MD   levothyroxine (SYNTHROID) 125 MCG tablet Take 125 mcg by mouth Daily    Historical Provider, MD   melatonin 3 MG TABS tablet Take 10 mg by mouth nightly as needed    Historical Provider, MD   memantine ER (NAMENDA XR) 21 MG CP24 extended release capsule Take 21 mg by mouth daily    Historical Provider, MD   omeprazole (PRILOSEC) 20 MG delayed release capsule Take 40 mg by mouth daily    Historical Provider, MD   risperiDONE (RISPERDAL M-TABS) 0.5 MG disintegrating tablet Take 0.5 mg by mouth 2 times daily    Historical Provider, MD   rosuvastatin (CRESTOR) 20 MG tablet Take 20 mg by mouth daily    Historical Provider, MD   rosuvastatin (CRESTOR) 40 MG tablet Take 40 mg by mouth every evening    Historical Provider, MD   QUEtiapine (SEROQUEL) 50 MG tablet Take 50 mg by mouth daily    Historical Provider, MD   acetaminophen (TYLENOL) 325 MG tablet Take 650 mg by mouth every 6 hours as needed for Pain    Historical Provider, MD   vitamin B-12 (CYANOCOBALAMIN) 1000 MCG tablet Take 1,000 mcg by mouth daily    Historical Provider, MD   ezetimibe (ZETIA) 10 MG tablet Take 5 mg by mouth daily    Historical Provider, MD   calcium carbonate 600 MG TABS tablet Take 1 tablet by mouth nightly    Historical Provider, MD   Docosahexaenoic Acid (DHA ALGAL-900 PO) Take by mouth daily    Historical Provider, MD   fluticasone (FLONASE) 50 MCG/ACT nasal spray 1 spray by Nasal route daily 1 spray each nostril daily    Historical Provider, MD   LUTEIN PO Take 1 capsule by mouth 2 times daily    Historical Provider, MD       Allergies:  Dolobid [diflunisal]; Fish-derived products; Horse-derived products; Iodine; Morphine; Nsaids; Other; Penicillins; Percocet [oxycodone-acetaminophen]; Shellfish-derived products; and Sulfa antibiotics    Social History:   reports that she has never smoked.  She has never used smokeless tobacco. She reports that she does not drink alcohol or use drugs. Family History: family history is not on file. REVIEW OF SYSTEMS:    12 point ROS has been done and pertinent neg and positive has been included in HPI and rest are non contributory          PHYSICAL EXAM:    BP (!) 120/55   Pulse 82   Temp 98.4 °F (36.9 °C) (Oral)   Resp 14   Ht 5' 2\" (1.575 m)   Wt 114 lb (51.7 kg)   SpO2 95%   BMI 20.85 kg/m²    VENT SETTINGS:   Vent Information  SpO2: 95 %    General appearance: Alert, Awake, not oriented times 3, no distress  Skin: Warm and dry  Eyes: Pink palpebral conjunctivae. PERRL  Ears: External ears normal.  Nose/Sinuses: Nares normal. Septum midline. Mucosa normal. No sinus tenderness. Oropharynx: Oropharynx clear with no exudates seen  Neck: Neck supple. No jugular venous distension, lymphadenopathy or thyromegaly Trachea midline  Lungs: Lungs clear to auscultation bilaterally. No rhonchi, crackles or wheezes  Heart: S1 S2  Regular rate and rhythm. No rub, murmur or gallop  Abdomen: Abdomen soft, non-tender. BS normal. No masses, organomegaly  Extremities: No edema, Peripheral pulses palpable  Musculoskeletal: Muscular strength appears intact. No joint effusion, tenderness, swelling or warmth  Purewick catheter in place    DATA:    Labs:     Last 3 CBC:  Recent Labs     10/12/20  1725 10/13/20  1028   WBC 10.1 6.6   RBC 3.28* 3.35*   HGB 9.8* 9.9*    168   MPV 10.8 10.7       Last 3 BMP  Recent Labs     10/12/20  1725 10/13/20  1028    141   K 4.1 3.5    109*   CO2 22 25   BUN 22 18   CREATININE 0.7 0.8   GLUCOSE 126* 131*   CALCIUM 9.0 8.8       LIVER PROFILE:  Recent Labs     10/12/20  1725 10/13/20  1028   * 129*   ALT 61* 74*   LABALBU 2.5* 2.4*       Microbiology :  Recent Labs     10/12/20  1725   BC 24 Hours no growth     No results for input(s): BLOODCULT2 in the last 72 hours.   Recent Labs     10/12/20  1936   LABURIN >100,000 CFU/ml     No results for input(s): CULTRESP in the last 72 hours. No results for input(s): WNDABS in the last 72 hours. Radiology :  CT ABDOMEN PELVIS WO CONTRAST Additional Contrast? None   Final Result   Mildly thickened segments of small bowel in the left mid abdomen which may   represent enteritis. Sigmoid diverticulosis with no evidence of diverticulitis. Large amount of stool in the colon which may reflect constipation. Scattered hepatic hypodensities likely cysts. Septal lines in the lung bases bilaterally which may be seen with mild   interstitial pulmonary edema. Clinical correlation. XR CHEST PORTABLE   Final Result   No acute pulmonary process. Assessment and Plan:      · UTI with E. Coli  · Encephalopathy/dementia    Plan  -Continue with ceftriaxone for now, okay to give p.o.  Omnicef 300 mg twice daily for 10 days at the time of discharge  -This might be her baseline with respect to her mentation    Thank you for your consult, please call for any Rito Glasgow MD

## 2020-10-15 NOTE — CARE COORDINATION
Social work/ Discharge Planning:       CM provided update that family prefers to return to [place] [id number]. The assisted living cannot accept back on IV antibiotics. Patient has been changed to inpatient status with new ID consult. Social work will follow with CM to assist with discharge planning.   Electronically signed by EVA Brito on 10/15/2020 at 10:34 AM

## 2020-10-15 NOTE — PROGRESS NOTES
Physical Therapy    Facility/Department: [address] INTERNAL MEDICINE 2  Treatment note    NAME: Behzad Thornton  : 3/25/1933  MRN: [id number]    Date of Service: 10/15/2020               Patient Diagnosis(es): The primary encounter diagnosis was Altered mental status, unspecified altered mental status type. Diagnoses of Nausea vomiting and diarrhea and Constipation, unspecified constipation type were also pertinent to this visit. has a past medical history of Dementia, Diaphragmatic hernia, Diverticulosis, Hyperlipidemia, Macular degeneration, and Thyroid disease. has no past surgical history on file. Evaluating Therapist: Sona Mohr PT     Referring Provider:  Jet Benitez MD     Room #: 12   DIAGNOSIS:  AMS   Additional Pertinent History: dementia   PRECAUTIONS: falls    Social:  Pt lives at [facility]   Prior to admission : pt unable to report     Initial Evaluation  Date: 10/   Treatment  10/15/2020    Short Term/ Long Term   Goals   Was pt agreeable to Eval/treatment? Yes  yes    Does pt have pain?  none reported  L LE pain during exercise    Bed Mobility  Rolling:  NT   Supine to sit: max assist   Sit to supine: max/dep assist   Scooting:  Max assist in sit  Rolling: Max A  Supine to sit: Max A  Scooting: Max A to EOB Min assist    Transfers Sit to stand:  Max assist   Stand to sit:  Max assist   Stand pivot:  NT  Sit to stand: Mod A  Stand to sit:  Max A  Stand Pivot; NT  min assist    Ambulation   6 side steps to [facility] with ww max assist  18 feet x 1 using [employer] for support Min/Mod A for balance  20  feet with  ww  with  Min assist    LE ROM  AAROM WFL      LE strength  Not formally tested due to decreased command following      AM- PAC RAW score   10/ 24  11/24          Pt is alert and able to follow instruction  Balance: poor dynamic using [employer] for support    Pt performed therapeutic exercise of the following: supine B ankle pumps, heel slides, hip ABd/ADd A/AAROM x 20    Patient education  Pt was educated on exercise promoting circulation and strengthening, UE usage to assist with transfers, gait promoting posture    Patient response to education:   Pt verbalized understanding Pt demonstrated skill Pt requires further education in this area   yes With prompt for transfers and gait postrue yes     ASSESSMENT:   Comments: Nurse ok with Rx. Pt found in bed, exercise performed. Gait in the room, golden slow and inconsistent, noted shuffle gait throughout. Pt unsteady throughout gait, required constant hands on assist for balance and safety, is unsafe to gait or transfer alone presently. Pt was left in a bedside chair with call light in reach, waffle cushion in place. Chair/bed alarm: chair alarm active    Time in 0835   Time out 0909   Total Treatment Time 34 minutes   CPT codes:     Therapeutic activities 23063 18 minutes   Therapeutic exercises 26916 16 minutes       Pt is making good progress toward established Physical Therapy goals as per increased functional mobility performed and exercise participation. Continue with physical therapy current plan of care.     Ezio Biswas PTA   License Number: [id number]

## 2020-10-15 NOTE — DISCHARGE SUMMARY
[facility] Physician Discharge Summary     No follow-up provider specified. Activity level   As tolerated    Disposition   [facility]      Condition on discharge Stable    Patient ID   Norma Brown, 80 y. o.female /  3/25/1933  / MRN [id number]    Admit date   10/12/2020    Discharge date  10/15/2020  1:41 PM    Admission diagnoses Active Problems:    AMS (altered mental status)    Altered mental status  Resolved Problems:    * No resolved hospital problems. *    Discharge diagnoses Same    Consults   IP CONSULT TO INFECTIOUS DISEASES    Procedures   See hospital course    Hospital Course  Rut Mcginnis is an 80F w PMH dementia, HPL, hypothyroidism admitted 10/12 from SNF with AMS. Workup ultimately appeared to demonstrate UTI w ESBL E coli. Currently on Rocephin with consult request to ID. They had recommended switching to PO Omnicef x10 days. With pt's workup otherwise fairly unremarkable, she is stable for transition back to assisted living on the [facility]. Discharge Exam  BP (!) 120/55   Pulse 82   Temp 98.4 °F (36.9 °C) (Oral)   Resp 14   Ht 5' 2\" (1.575 m)   Wt 114 lb (51.7 kg)   SpO2 95%   BMI 20.85 kg/m²   General Appearance: thin but non toxic, mostly alert and oriented to person only and in no acute distress  Skin: warm and dry  Head: normocephalic and atraumatic  Eyes: pupils equal, round, and reactive to light, extraocular eye movements intact, conjunctivae normal  Neck: neck supple and non tender without mass   Pulmonary/Chest: clear to auscultation bilaterally- no wheezes, rales or rhonchi, normal air movement, no respiratory distress  Cardiovascular: normal rate, normal S1 and S2 and no carotid bruits  Abdomen: soft, non-tender, non-distended, normal bowel sounds, no masses or organomegaly  Extremities: no cyanosis, no clubbing and no edema  Neurologic: no cranial nerve deficit and speech normal    I/O last 3 completed shifts:   In:  [P.O.:600; I.V.:1377]  Out: 800 [Urine:800]  I/O this shift: In: 5 [P.O.:420]  Out: -     Labs  Recent Labs     10/12/20  1725 10/13/20  1028    141   K 4.1 3.5    109*   CO2 22 25   BUN 22 18   CREATININE 0.7 0.8   GLUCOSE 126* 131*   CALCIUM 9.0 8.8   WBC 10.1 6.6   RBC 3.28* 3.35*   HGB 9.8* 9.9*   HCT 29.6* 30.7*   MCV 90.2 91.6   MCH 29.9 29.6   MCHC 33.1 32.2   RDW 14.3 14.3    168   MPV 10.8 10.7       Imaging  Ct Abdomen Pelvis Wo Contrast Additional Contrast? None  Result Date: 10/12/2020  Mildly thickened segments of small bowel in the left mid abdomen which may represent enteritis. Sigmoid diverticulosis with no evidence of diverticulitis. Large amount of stool in the colon which may reflect constipation. Scattered hepatic hypodensities likely cysts. Septal lines in the lung bases bilaterally which may be seen with mild interstitial pulmonary edema. Clinical correlation. Xr Chest Portable  Result Date: 10/12/2020  No acute pulmonary process. Patient Instructions     Medication List      START taking these medications    cefdinir 300 MG capsule  Commonly known as:  OMNICEF  Take 1 capsule by mouth 2 times daily for 10 days        CHANGE how you take these medications    levothyroxine 125 MCG tablet  Commonly known as:  SYNTHROID  What changed:  Another medication with the same name was removed. Continue taking this medication, and follow the directions you see here. rosuvastatin 40 MG tablet  Commonly known as:  CRESTOR  What changed:  Another medication with the same name was removed. Continue taking this medication, and follow the directions you see here.         CONTINUE taking these medications    acetaminophen 325 MG tablet  Commonly known as:  TYLENOL     azelastine 0.1 % nasal spray  Commonly known as:  ASTELIN     calcium carbonate 600 MG Tabs tablet     citalopram 10 MG tablet  Commonly known as:  CELEXA     Depakote 125 MG DR tablet  Generic drug:  divalproex     DHA ALGAL-900 PO     donepezil 10 MG tablet  Commonly known as:  ARICEPT     ezetimibe 10 MG tablet  Commonly known as:  ZETIA     ferrous sulfate 325 (65 Fe) MG tablet  Commonly known as:  IRON 325     fluticasone 50 MCG/ACT nasal spray  Commonly known as:  FLONASE     LUTEIN PO     melatonin 3 MG Tabs tablet     Namenda XR 21 MG Cp24 extended release capsule  Generic drug:  memantine ER     omeprazole 20 MG delayed release capsule  Commonly known as:  PRILOSEC     vitamin B-12 1000 MCG tablet  Commonly known as:  CYANOCOBALAMIN        ASK your doctor about these medications    risperiDONE 0.5 MG disintegrating tablet  Commonly known as:  RISPERDAL M-TABS     SEROquel 50 MG tablet  Generic drug:  QUEtiapine           Where to Get Your Medications      These medications were sent to [place] [address]    Phone:  [phone]   · cefdinir 300 MG capsule       Note that more than 30 minutes was spent in preparing discharge papers, discussing discharge with patient, medication review, etc.    Electronically signed by Keli Back DO on 10/15/2020 at 1:41 PM

## 2020-10-15 NOTE — PROGRESS NOTES
Occupational Therapy  OT BEDSIDE TREATMENT NOTE      Date:10/15/2020  Patient Name: Ford Gomez  MRN: [id number]  : 3/25/1933  Room: [address] [place], [place] [address]     Evaluating OT: Tia Edward OTR/L [id number]     AM-PAC Daily Activity Raw Score:      Recommended Adaptive Equipment: TBD     Diagnosis: AMS. Pt presents to ED from [facility] with diarrhea, AMS, fatigue and lethargy     Pertinent Medical History: dementia, macular degeneration   Precautions:  falls     Home Living: Pt is a poor historian unable to provide PLOF. Per medical chart pt is a LTC resident of [facility]. Use of [employer] for mobility. Unknown level of assist with ADLs.    Pain Level: no reported or observable signs of pain     Cognition: A&O: . Pt is oriented to self only. Increased alertness/awareness as compared to previous session. Pleasantly confused throughout session. Able to verbalize her needs.                Problem solving:  poor              Judgement/safety:  poor                Functional Assessment:    Initial Eval Status  Date: 10/14/20 Treatment session:   10/15/20 Short Term Goals  Treatment frequency: 2-5x/wk PRN x1-3 wks      Feeding Max A  Hand over hand for lunch tray with use of fork and cup with straw  Mod A  Holding cup to drink Min A   Grooming Max A  Hand over hand washing face pre and post lunch  Mod A  Hand hygiene, unable to tolerate standing sink level Min A   UB Dressing Dependent  Management of hospital gown  Max A  Management of hospital gown Mod A   LB Dressing Dependent  Management of B socks   Max A    Bathing Max A   Mod A   Toileting Dependent  Max A  Use of grab bar for support in transfer and to maintain balance in standing  Total assist in vandana care Mod A   Bed Mobility  Supine to sit: Dependent  Sit to Supine: Max A  Supine to Sit: Max A     Functional Transfers STS: Max A STS: Max A  Strong posterior lean increased time to achieve upright balance  Mod A   Functional Mobility Max A with HHA  Small side steps to [facility]  Mod A with [employer]  Bed <> bathroom  Posterior lean and assist to manage [employer] Mod A during ADLs   Balance Sitting: varying levels from poor plus to fair minus  Sitting saurav EOB x10 min prior to increased fatigue and decreased attempt to keep feet on floor     Standing: poor HHA  Standing saurav x30 seconds x2 trials Sitting: fair/fair minus at EOB and commode    Standing: poor plus at [facility]     Activity Tolerance poor  poor plus standing saurav x2-3 min with fair minus balance during self care tasks       Education: Patient educated on techniques for completion of ADL, safe functional transfers and functional mobility. Patient required cues for follow through with proper hand/foot placement, pacing, safety, attention and technique in bed mobility, functional transfers, functional mobility, toileting, grooming, UB dressing, self feeding and LB dressing in preparation for maximum independence in all self care tasks. Comments: Upon arrival pt supine in bed. At end of session seated in armchair all lines and tubes intact, call light and phone within reach. Bed/chair alarm: ON    · Pt has made good progress towards set goals.      Time In: 1421  Time Out: [address]     Therapeutic Activities 22292 4    ADL/Self Care 07101 20 2   Orthotic Management 88465     Neuro Re-Ed [id number]     Non-Billable Time     TOTAL TIMED TREATMENT 24 2       Ed Watkins OTR/L  [id number]

## 2020-10-15 NOTE — DISCHARGE INSTR - COC
Continuity of Care Form    Patient Name: Jcarlos Lassiter   :  3/25/1933  MRN:  [id number]    Admit date:  10/12/2020  Discharge date:  10/15/2020    Code Status Order: DNR-CCA   Advance Directives:   [address] [facility] Documentation       Date/Time Healthcare Directive Type of Healthcare Directive Copy in [address] Agent's Name Healthcare Agent's Phone Number    10/13/20 0038  Yes, patient has an advance directive for healthcare treatment  Living will;Durable power of  for health care  Yes, copy in chart  Adult [address]  [phone]            Admitting Physician:  Hussein Davidson DO  PCP: Gelene Holstein, MD    Discharging Nurse: [facility] Unit/Room#: 9188/8902-W  Discharging Unit Phone Number: [phone]    Emergency Contact:   Extended Emergency Contact Information  Primary Emergency Contact: Ricky Chris [facility] [address] Phone: [phone]  Relation: Child    Past Surgical History:  No past surgical history on file. Immunization History: There is no immunization history for the selected administration types on file for this patient.     Active Problems:  Patient Active Problem List   Diagnosis Code    AMS (altered mental status) R41.82    Altered mental status R41.82       Isolation/Infection:   Isolation            No Isolation          Patient Infection Status       Infection Onset Added Last Indicated Last Indicated By Review Planned Expiration Resolved Resolved By    None active    Resolved    COVID-19 Rule Out 10/12/20 10/12/20 10/12/20 COVID-19 (Ordered)   10/12/20 Rule-Out Test Resulted            Nurse Assessment:  Last Vital Signs: BP (!) 120/55   Pulse 82   Temp 98.4 °F (36.9 °C) (Oral)   Resp 14   Ht 5' 2\" (1.575 m)   Wt 114 lb (51.7 kg)   SpO2 95%   BMI 20.85 kg/m²     Last documented pain score (0-10 scale): Pain Level: 0  Last Weight:   Wt Readings from Last 1 Encounters:   10/14/20 114 lb (51.7 kg)     Mental Status:  alert    IV Access:  - None    Nursing Mobility/ADLs:  Walking   Assisted  Transfer  Assisted  Bathing  Assisted  Dressing  Assisted  Toileting  Assisted  Feeding  Assisted  Med Admin  Assisted  Med Delivery   whole    Wound Care Documentation and Therapy:        Elimination:  Continence: Bowel:   Bladder: No  Urinary Catheter: None   Colostomy/Ileostomy/Ileal Conduit: No       Date of Last BM:     Intake/Output Summary (Last 24 hours) at 10/15/2020 1517  Last data filed at 10/15/2020 1321  Gross per 24 hour   Intake 2277 ml   Output 800 ml   Net 1477 ml     I/O last 3 completed shifts: In: 2277 [P.O.:900; I.V.:1377]  Out: 800 [Urine:800]    Safety Concerns: At Risk for Falls    Impairments/Disabilities:      None    Nutrition Therapy:  Current Nutrition Therapy:   - Oral Diet:  Cardiac    Routes of Feeding: Oral  Liquids: No Restrictions  Daily Fluid Restriction: no  Last Modified Barium Swallow with Video (Video Swallowing Test): not done    Treatments at the Time of Hospital Discharge:   Respiratory Treatments:   Oxygen Therapy:  is not on home oxygen therapy.   Ventilator:    - No ventilator support    Rehab Therapies: Physical Therapy and Occupational Therapy  Weight Bearing Status/Restrictions: No weight bearing restirctions  Other Medical Equipment (for information only, NOT a DME order):  walker  Other Treatments:     Patient's personal belongings (please select all that are sent with patient):  None    RN SIGNATURE:  Electronically signed by Maximo Hooks RN on 10/15/2020 at 3:19 PM        CASE MANAGEMENT/SOCIAL WORK SECTION    Inpatient Status Date: ***    Readmission Risk Assessment Score:  Readmission Risk              Risk of Unplanned Readmission:        15           Discharging to Facility/ Agency   Name:   Address:  Phone:  Fax:    Dialysis Facility (if applicable)   Name:  Address:  Dialysis Schedule:  Phone:  Fax:    / signature: {Esignature:[id number]}    PHYSICIAN SECTION    Prognosis: Good    Condition at Discharge: Stable    Rehab Potential (if transferring to Rehab): Good    Recommended Labs or Other Treatments After Discharge: None    Physician Certification: I certify the above information and transfer of Doretha Cedillo  is necessary for the continuing treatment of the diagnosis listed and that she requires Assisted Living for greater 30 days.      Update Admission H&P: No change in H&P    PHYSICIAN SIGNATURE:  Electronically signed by Colonel Flo DO on 10/15/20 at 3:20 PM EDT

## 2020-10-15 NOTE — CARE COORDINATION
10/15/20 1516   IMM Letter   IMM Letter given to Patient/Family/Significant other/Guardian/POA/by: Nel Pierre RN   IMM Letter date given: 10/15/20   IMM Letter time given: 12   Important message from Medicare delivered to patient's son. A signed copy is placed in patient's lite chart.  Another copy left with patient's son  Nel Pierre RN

## 2020-10-15 NOTE — PLAN OF CARE
Problem: Falls - Risk of:  Goal: Will remain free from falls  Description: Will remain free from falls  10/13/2020 2206 by Kellee Dalal RN  Outcome: Met This Shift  10/13/2020 1105 by Luan Stanton RN  Outcome: Met This Shift  Goal: Absence of physical injury  Description: Absence of physical injury  10/13/2020 1105 by Luan Stanton RN  Outcome: Met This Shift     Problem: Skin Integrity:  Goal: Will show no infection signs and symptoms  Description: Will show no infection signs and symptoms  Outcome: Met This Shift     Problem: Injury - Risk of, Physical Injury:  Goal: Will remain free from falls  Description: Will remain free from falls  10/13/2020 2206 by Kellee Dalal RN  Outcome: Met This Shift  10/13/2020 1105 by Luan Stanton RN  Outcome: Met This Shift  Goal: Absence of physical injury  Description: Absence of physical injury  10/13/2020 1105 by Luan Stanton RN  Outcome: Met This Shift     Problem: Mood - Altered:  Goal: Mood stable  Description: Mood stable  Outcome: Met This Shift     Problem: Confusion - Acute:  Goal: Absence of continued neurological deterioration signs and symptoms  Description: Absence of continued neurological deterioration signs and symptoms  Outcome: Not Met This Shift     Problem: Discharge Planning:  Goal: Ability to perform activities of daily living will improve  Description: Ability to perform activities of daily living will improve  Outcome: Not Met This Shift
Problem: Falls - Risk of:  Goal: Will remain free from falls  Description: Will remain free from falls  Outcome: Completed  Goal: Absence of physical injury  Description: Absence of physical injury  Outcome: Completed     Problem: Skin Integrity:  Goal: Will show no infection signs and symptoms  Description: Will show no infection signs and symptoms  Outcome: Completed  Goal: Absence of new skin breakdown  Description: Absence of new skin breakdown  Outcome: Completed     Problem: Confusion - Acute:  Goal: Absence of continued neurological deterioration signs and symptoms  Description: Absence of continued neurological deterioration signs and symptoms  Outcome: Completed  Goal: Mental status will be restored to baseline  Description: Mental status will be restored to baseline  Outcome: Completed     Problem: Discharge Planning:  Goal: Ability to perform activities of daily living will improve  Description: Ability to perform activities of daily living will improve  Outcome: Completed  Goal: Participates in care planning  Description: Participates in care planning  Outcome: Completed     Problem: Injury - Risk of, Physical Injury:  Goal: Will remain free from falls  Description: Will remain free from falls  Outcome: Completed  Goal: Absence of physical injury  Description: Absence of physical injury  Outcome: Completed     Problem: Mood - Altered:  Goal: Mood stable  Description: Mood stable  Outcome: Completed  Goal: Absence of abusive behavior  Description: Absence of abusive behavior  Outcome: Completed  Goal: Verbalizations of feeling emotionally comfortable while being cared for will increase  Description: Verbalizations of feeling emotionally comfortable while being cared for will increase  Outcome: Completed     Problem: Psychomotor Activity - Altered:  Goal: Absence of psychomotor disturbance signs and symptoms  Description: Absence of psychomotor disturbance signs and symptoms  Outcome: Completed
psychomotor disturbance signs and symptoms  Outcome: Met This Shift     Problem: Sensory Perception - Impaired:  Goal: Demonstrations of improved sensory functioning will increase  Description: Demonstrations of improved sensory functioning will increase  Outcome: Met This Shift  Goal: Decrease in sensory misperception frequency  Description: Decrease in sensory misperception frequency  Outcome: Met This Shift  Goal: Able to refrain from responding to false sensory perceptions  Description: Able to refrain from responding to false sensory perceptions  Outcome: Met This Shift  Goal: Demonstrates accurate environmental perceptions  Description: Demonstrates accurate environmental perceptions  Outcome: Not Met This Shift  Goal: Able to distinguish between reality-based and nonreality-based thinking  Description: Able to distinguish between reality-based and nonreality-based thinking  Outcome: Met This Shift  Goal: Able to interrupt nonreality-based thinking  Description: Able to interrupt nonreality-based thinking  Outcome: Met This Shift     Problem: Sleep Pattern Disturbance:  Goal: Appears well-rested  Description: Appears well-rested  Outcome: Met This Shift

## 2020-10-15 NOTE — PROGRESS NOTES
Physician Progress Note      Tiffani Stone  [facility] #:                  [id number]  :                       3/25/1933  ADMIT DATE:       10/12/2020 4:37 PM  DISCH DATE:  RESPONDING  PROVIDER #:        JONY DOLAN DO        QUERY TEXT:    Type of Encephalopathy: Please provide further specificity, if known. Options provided:  -- Anoxic/hypoxic encephalopathy  -- Metabolic encephalopathy  -- Toxic encephalopathy  -- Hepatic encephalopathy  -- Hypertensive encephalopathy        PROVIDER RESPONSE TEXT:    The patient has toxic encephalopathy.       Electronically signed by:  Gorge Hodges DO 10/15/2020 3:21 PM

## 2020-10-17 LAB — BLOOD CULTURE, ROUTINE: NORMAL

## 2020-10-19 NOTE — ADT AUTH CERT
Utilization Reviews         Neurology [address] Day 4 (10/15/2020) by Mora Jackson RN         Review Status  Review Entered    Completed  10/16/2020 14:21        Criteria Review       Care Day: 4 Care Date: 10/15/2020 Level of Care: Intermediate Care    Guideline Day 3    Level Of Care    ( ) * Activity level acceptable    ( ) Floor to discharge    10/16/2020 2:21 PM EDT by Ankur Frias      10/15   failed observation   made inpatient today 10/15 ** **   dx. AMS   remains on intermediate telemetry unit    ( ) * Complete discharge planning    Clinical Status    ( ) * No infection, or status acceptable    ( ) * Isolation not needed, or status acceptable    ( ) * Respiratory status acceptable    ( ) * Pain and nausea absent or adequately managed    ( ) * Ventilatory status acceptable    ( ) * Neurologic problems absent or stabilized    ( ) * Muscle or nerve damage absent or stable    ( ) * General Discharge Criteria met    Interventions    ( ) * Intake acceptable    ( ) * No inpatient interventions needed    10/16/2020 2:21 PM EDT by Ankur Frias      cont ivfs ns 100cc/hr   cont iv rocephin 1gm qd    * Milestone    Additional Notes    10/15 - Inpatient CD 1       failed observation    made inpatient today 10/15 ** **    dx. AMS       remains on intermediate telemetry unit    mt yesterday 99.7    afebrile today  14 82 120/55    95% ra  SR on monitor    alert to self & place, confused x2       ID A/P:    Reason for Consult: Altered mental status         CC:    Chief Complaint    Patient presents with    Harriett Prima - patient has loose stools and is weak    ·Emesis - EMS was given history of emesis       General appearance: Alert, Awake, not oriented times 3, no distress    Skin: Warm and dry    Eyes: Pink palpebral conjunctivae. PERRL    Ears: External ears normal.    Nose/Sinuses: Nares normal. Septum midline. Mucosa normal.    No sinus tenderness.     Oropharynx: Oropharynx clear with no exudates seen    Neck: Neck supple. No jugular venous distension, lymphadenopathy    or thyromegaly Trachea midline    Lungs: Lungs clear to auscultation bilaterally.  No rhonchi,    crackles or wheezes    Heart: S1 S2  Regular rate and rhythm. No rub, murmur or gallop    Abdomen: Abdomen soft, non-tender. BS normal. No masses, organomegaly    Extremities: No edema, Peripheral pulses palpable    Musculoskeletal: Muscular strength appears intact. No joint    effusion, tenderness, swelling or warmth    Purewick catheter in place         HISTORY OF PRESENT ILLNESS:      The patient is a 80 y.o. female admitted with altered    mental status, most of the history obtained is from medical    records. Digna Parikh does not know why she is in the hospital.      She was found to have UTI currently on ceftriaxone and urine    cultures are growing E. Coli.      CT abdomen pelvis with IV contrast shows mildly thickened    segments of small bowel in the left mid abdomen which may    represent enteritis. ·UTI with E. Coli    ·Encephalopathy/dementia         Plan    -Continue with ceftriaxone for now    -This might be her baseline with respect to her mentation       cont ocuvite, oscal, celexa, depakote, synthroid    cont sq lovenox, zetia, ferrous sulfate, protonix    cont seroquel, risperdal, crestor, vit b12    cont ivfs ns 100cc/hr    cont iv rocephin 1gm qd . . started 10/13 (received dose in ER 10/12)          d/c plan: return to half-way when medically stable                                                  PA recommendation by Emi Garay RN         Review Status  Review Entered    In Primary  10/15/2020 09:05        Criteria Review    obs list upgrade    We recommend that the following pt's current hospitalization under OBSERVATION   status is upgraded to [address]    Name: Barb Stone   : 3/25/1933   CSN: [id number]   INSURANCE: [facility]      Admitted in the evening of , sent from [facility] with change in mental status,  baseline dementia, recent increase in diarrhea and stool incontinence. UTI. E. coli from urine culture resistant to ampicillin, Levaquin, Bactrim. MCG criteria applies Y    This chart was reviewed at 5:05 PM 10/14/2020    Tab Kaiser MD   Physician [facility]        COVID-19 by Edwin Onofre RN         Review Status  Review Entered    In Primary  10/13/2020 16:13        Criteria Review    The illness suspected to be related to the Coronavirus (COVID-19)? No      Has the member been tested for the COVID-19? Yes      If Yes, what are the results of the COVID-19?               Negative

## 2022-12-16 NOTE — CARE COORDINATION
Notified by liaison with [facility] that patient was active with their service at [facility] prior to admission. Order noted to resume services. They will follow post transfer to [address].  Ab MSN, RN  [facility] Case Management  [phone] Refill request from Pharmacy     [facility] on file